# Patient Record
Sex: FEMALE | Race: WHITE | NOT HISPANIC OR LATINO | Employment: FULL TIME | URBAN - METROPOLITAN AREA
[De-identification: names, ages, dates, MRNs, and addresses within clinical notes are randomized per-mention and may not be internally consistent; named-entity substitution may affect disease eponyms.]

---

## 2017-11-06 ENCOUNTER — HOSPITAL ENCOUNTER (OUTPATIENT)
Dept: RADIOLOGY | Facility: HOSPITAL | Age: 32
Discharge: HOME | End: 2017-11-06
Attending: ORTHOPAEDIC SURGERY
Payer: COMMERCIAL

## 2017-11-06 ENCOUNTER — OFFICE VISIT (OUTPATIENT)
Dept: SPORTS MEDICINE | Facility: CLINIC | Age: 32
End: 2017-11-06
Payer: COMMERCIAL

## 2017-11-06 VITALS
BODY MASS INDEX: 21.97 KG/M2 | SYSTOLIC BLOOD PRESSURE: 111 MMHG | HEIGHT: 67 IN | HEART RATE: 86 BPM | DIASTOLIC BLOOD PRESSURE: 74 MMHG | WEIGHT: 140 LBS

## 2017-11-06 DIAGNOSIS — M25.562 LEFT KNEE PAIN, UNSPECIFIED CHRONICITY: Primary | ICD-10-CM

## 2017-11-06 DIAGNOSIS — Z98.890 S/P ACL RECONSTRUCTION: ICD-10-CM

## 2017-11-06 DIAGNOSIS — S83.512A RUPTURE OF ANTERIOR CRUCIATE LIGAMENT OF LEFT KNEE, INITIAL ENCOUNTER: ICD-10-CM

## 2017-11-06 DIAGNOSIS — M25.562 LEFT KNEE PAIN, UNSPECIFIED CHRONICITY: ICD-10-CM

## 2017-11-06 DIAGNOSIS — M23.92 INTERNAL DERANGEMENT OF KNEE JOINT, LEFT: ICD-10-CM

## 2017-11-06 PROCEDURE — 99999 PR PBB SHADOW E&M-NEW PATIENT-LVL IV: CPT | Mod: PBBFAC,,, | Performed by: ORTHOPAEDIC SURGERY

## 2017-11-06 PROCEDURE — 73564 X-RAY EXAM KNEE 4 OR MORE: CPT | Mod: TC,50,PO

## 2017-11-06 PROCEDURE — 73564 X-RAY EXAM KNEE 4 OR MORE: CPT | Mod: 26,50,, | Performed by: RADIOLOGY

## 2017-11-06 PROCEDURE — 99204 OFFICE O/P NEW MOD 45 MIN: CPT | Mod: 57,S$GLB,, | Performed by: ORTHOPAEDIC SURGERY

## 2017-11-06 NOTE — PROGRESS NOTES
Subjective:          Chief Complaint: Swapna Watkins is a 32 y.o. female who had concerns including Pain of the Left Knee.    Patient reports locking beginning in two years prior.  Denies trauma or inciting incident. Has MRI from 10/26/16 showing ACL graft rupture. Denies pain.    History of L ACL reconstruction 12/6/11 by Dr. Luke.        Pain   Pertinent negatives include no chest pain, fever, joint swelling, numbness or rash.       Review of Systems   Constitution: Negative for fever and night sweats.   HENT: Negative for hearing loss.    Eyes: Negative for blurred vision and visual disturbance.   Cardiovascular: Negative for chest pain and leg swelling.   Respiratory: Negative for shortness of breath.    Endocrine: Negative for polyuria.   Hematologic/Lymphatic: Negative for bleeding problem.   Skin: Negative for rash.   Musculoskeletal: Positive for joint pain. Negative for back pain, joint swelling, muscle cramps and muscle weakness.   Gastrointestinal: Negative for melena.   Genitourinary: Negative for hematuria.   Neurological: Negative for loss of balance, numbness and paresthesias.   Psychiatric/Behavioral: Negative for altered mental status.       Pain Related Questions  Over the past 3 days, what was your average pain during activity? (I.e. running, jogging, walking, climbing stairs, getting dressed, ect.): 0  Over the past 3 days, what was your highest pain level?: 0  Over the past 3 days, what was your lowest pain level? : 0    Other  How many nights a week are you awakened by your affected body part?: 0  Was the patient's HEIGHT measured or patient reported?: Patient Reported  Was the patient's WEIGHT measured or patient reported?: Measured      Objective:        General: Swapna CORONA is well-developed, well-nourished, appears stated age, in no acute distress, alert and oriented to time, place and person.     General    Vitals reviewed.  Constitutional: She is oriented to person, place, and time. She  appears well-developed and well-nourished. No distress.   HENT:   Mouth/Throat: No oropharyngeal exudate.   Eyes: Right eye exhibits no discharge. Left eye exhibits no discharge.   Neck: Normal range of motion.   Pulmonary/Chest: Effort normal and breath sounds normal. No respiratory distress.   Neurological: She is alert and oriented to person, place, and time. She has normal reflexes. No cranial nerve deficit. Coordination normal.   Psychiatric: She has a normal mood and affect. Her behavior is normal. Judgment and thought content normal.     General Musculoskeletal Exam   Gait: normal       Right Knee Exam     Inspection   Erythema: absent  Scars: absent  Swelling: absent  Effusion: effusion  Deformity: deformity  Bruising: absent    Tenderness   The patient is experiencing no tenderness.         Range of Motion   Extension: -5   Flexion: 150     Tests   Meniscus   Shayna:  Medial - negative Lateral - negative  Ligament Examination Lachman: normal (-1 to 2mm) PCL-Posterior Drawer: normal (0 to 2mm)     MCL - Valgus: normal (0 to 2mm)  LCL - Varus: normalPivot Shift: normal (Equal)Reverse Pivot Shift: normal (Equal)Dial Test at 30 degrees: normal (< 5 degrees)Dial Test at 90 degrees: normal (< 5 degrees)  Posterior Sag Test: negative  Posterolateral Corner: unstable (>15 degrees difference)  Patella   Patellar Apprehension: negative  Passive Patellar Tilt: neutral  Patellar Tracking: normal  Patellar Glide (quadrants): Lateral - 1   Medial - 2  Q-Angle at 90 degrees: normal  Patellar Grind: negative  J-Sign: none    Other   Meniscal Cyst: absent  Popliteal (Baker's) Cyst: absent  Sensation: normal    Left Knee Exam     Inspection   Erythema: absent  Scars: absent  Swelling: absent  Effusion: absent  Deformity: deformity  Bruising: absent    Tenderness   The patient tender to palpation of the lateral joint line.    Range of Motion   Extension: 0   Flexion: 150     Tests   Meniscus   Shayna:  Medial - negative  Lateral - negative  Stability Lachman: normal (-1 to 2mm) PCL-Posterior Drawer: normal (0 to 2mm)  MCL - Valgus: normal (0 to 2mm)  LCL - Varus: normal (0 to 2mm)Pivot Shift: normal (Equal)Reverse Pivot Shift: normal (Equal)Dial Test at 30 degrees: normal (< 5 degrees)Dial Test at 90 degrees: normal (< 5 degrees)  Posterior Sag Test: negative  Posterolateral Corner: unstable (>15 degrees difference)  Patella   Patellar Apprehension: negative  Passive Patellar Tilt: neutral  Patellar Tracking: normal  Patellar Glide (Quadrants): Lateral - 1 Medial - 2  Q-Angle at 90 degrees: normal  Patellar Grind: negative  J-Sign: J sign absent    Other   Meniscal Cyst: absent  Popliteal (Baker's) Cyst: absent  Sensation: normal    Right Hip Exam     Tests   Heather: negative  Left Hip Exam     Tests   Heather: negative          Reflexes     Left Side  Quadriceps:  2+  Achilles:  2+    Right Side   Quadriceps:  2+  Achilles:  2+    Vascular Exam     Right Pulses  Dorsalis Pedis:      2+  Posterior Tibial:      2+        Left Pulses  Dorsalis Pedis:      2+  Posterior Tibial:      2+        MRI 10/26/16:    There is a complete rupture of the ACL graft and there is associated osteolysis            Assessment:       Encounter Diagnoses   Name Primary?    Left knee pain, unspecified chronicity Yes    Internal derangement of knee joint, left     S/P ACL reconstruction     Rupture of anterior cruciate ligament of left knee, initial encounter           Plan:       1. IKDC, SF-12 and KOOS was filled out today in clinic.     RTC in 1 weeks with Dr. Washington Deutsch for CT review. Patient will not fill out IKDC, SF-12 and KOOS on return.    2. CT left knee to evaluate tunnel location and size    3. We reviewed with Swapna CORONA today, the pathology and natural history of her diagnosis. We have discussed a variety of treatment options including medications, physical therapy and other alternative treatments. I also explained the indications, risks and  benefits of surgery. After discussion, Swapna CORONA decided to proceed with surgery. The decision was made to go forward with     Stage 1    1. Left knee arthroscopic debridement bone tunnel  2. Left knee arthroscopic chondroplasty  3. Left knee arthroscopic synovectomy  4. Left tibia bone tunnel grafting  5. Left femur bone tunnel bone grafting  6. Left iliac crest graft harvest  7. Left knee Amniox arthrocentesis    Stage 2  1. Left knee ACL reconstruction with hamstring autograft (double bundle)  2. Left knee Amniox arthrocentesis      The details of the surgical procedure were explained, including the location of probable incisions and a description of likely hardware and/or grafts to be used.  The patient understands the likely convalescence after surgery.  Also, we have thoroughly discussed the risks, benefits and alternatives to surgery, including, but not limited to, the risk of infection, joint stiffness, blood clot (including DVT and/or pulmonary embolus), neurologic and vascular injury.  It was explained that, if tissue has been repaired or reconstructed, there is a chance of failure, which may require further management.      All of the patient's questions were answered and informed consent was obtained. The patient will contact us if they have any questions or concerns in the interim.                    [facility] patient questionnaires have been collected today.

## 2017-11-13 ENCOUNTER — OFFICE VISIT (OUTPATIENT)
Dept: SPORTS MEDICINE | Facility: CLINIC | Age: 32
End: 2017-11-13
Payer: COMMERCIAL

## 2017-11-13 VITALS
HEART RATE: 85 BPM | WEIGHT: 140 LBS | SYSTOLIC BLOOD PRESSURE: 139 MMHG | DIASTOLIC BLOOD PRESSURE: 84 MMHG | BODY MASS INDEX: 21.97 KG/M2 | HEIGHT: 67 IN

## 2017-11-13 DIAGNOSIS — M23.92 INTERNAL DERANGEMENT OF KNEE JOINT, LEFT: ICD-10-CM

## 2017-11-13 DIAGNOSIS — M23.92 INTERNAL DERANGEMENT OF LEFT KNEE: ICD-10-CM

## 2017-11-13 DIAGNOSIS — Z98.890 S/P ACL RECONSTRUCTION: Primary | ICD-10-CM

## 2017-11-13 DIAGNOSIS — S83.512A RUPTURE OF ANTERIOR CRUCIATE LIGAMENT OF LEFT KNEE, INITIAL ENCOUNTER: ICD-10-CM

## 2017-11-13 DIAGNOSIS — S83.512A RUPTURE OF ANTERIOR CRUCIATE LIGAMENT OF LEFT KNEE, INITIAL ENCOUNTER: Primary | ICD-10-CM

## 2017-11-13 DIAGNOSIS — M85.60 BONE CYST: ICD-10-CM

## 2017-11-13 PROCEDURE — 99214 OFFICE O/P EST MOD 30 MIN: CPT | Mod: S$GLB,,, | Performed by: ORTHOPAEDIC SURGERY

## 2017-11-13 PROCEDURE — 99999 PR PBB SHADOW E&M-EST. PATIENT-LVL III: CPT | Mod: PBBFAC,,, | Performed by: ORTHOPAEDIC SURGERY

## 2017-11-13 NOTE — PROGRESS NOTES
Subjective:          Chief Complaint: Swapna Watkins is a 32 y.o. female who had concerns including Follow-up of the Left Knee.    Patient is here for a follow up for her left knee to review her CT scan.    Has MRI from 10/26/16 showing ACL graft rupture.     History of L ACL reconstruction 12/6/11 by Dr. Luke.        Pain   Pertinent negatives include no chest pain, fever, joint swelling, numbness or rash.       Review of Systems   Constitution: Negative for fever and night sweats.   HENT: Negative for hearing loss.    Eyes: Negative for blurred vision and visual disturbance.   Cardiovascular: Negative for chest pain and leg swelling.   Respiratory: Negative for shortness of breath.    Endocrine: Negative for polyuria.   Hematologic/Lymphatic: Negative for bleeding problem.   Skin: Negative for rash.   Musculoskeletal: Positive for joint pain. Negative for back pain, joint swelling, muscle cramps and muscle weakness.   Gastrointestinal: Negative for melena.   Genitourinary: Negative for hematuria.   Neurological: Negative for loss of balance, numbness and paresthesias.   Psychiatric/Behavioral: Negative for altered mental status.       Pain Related Questions  Over the past 3 days, what was your average pain during activity? (I.e. running, jogging, walking, climbing stairs, getting dressed, ect.): 0  Over the past 3 days, what was your highest pain level?: 0  Over the past 3 days, what was your lowest pain level? : 0    Other  How many nights a week are you awakened by your affected body part?: 0  Was the patient's HEIGHT measured or patient reported?: Patient Reported  Was the patient's WEIGHT measured or patient reported?: Measured      Objective:        General: Swapna CORONA is well-developed, well-nourished, appears stated age, in no acute distress, alert and oriented to time, place and person.     General    Vitals reviewed.  Constitutional: She is oriented to person, place, and time. She appears well-developed and  well-nourished. No distress.   HENT:   Mouth/Throat: No oropharyngeal exudate.   Eyes: Right eye exhibits no discharge. Left eye exhibits no discharge.   Neck: Normal range of motion.   Pulmonary/Chest: Effort normal and breath sounds normal. No respiratory distress.   Neurological: She is alert and oriented to person, place, and time. She has normal reflexes. No cranial nerve deficit. Coordination normal.   Psychiatric: She has a normal mood and affect. Her behavior is normal. Judgment and thought content normal.     General Musculoskeletal Exam   Gait: normal       Right Knee Exam     Inspection   Erythema: absent  Scars: absent  Swelling: absent  Effusion: effusion  Deformity: deformity  Bruising: absent    Tenderness   The patient is experiencing no tenderness.         Range of Motion   Extension: -5   Flexion: 150     Tests   Meniscus   Shayna:  Medial - negative Lateral - negative  Ligament Examination Lachman: normal (-1 to 2mm) PCL-Posterior Drawer: normal (0 to 2mm)     MCL - Valgus: normal (0 to 2mm)  LCL - Varus: normalPivot Shift: normal (Equal)Reverse Pivot Shift: normal (Equal)Dial Test at 30 degrees: normal (< 5 degrees)Dial Test at 90 degrees: normal (< 5 degrees)  Posterior Sag Test: negative  Posterolateral Corner: unstable (>15 degrees difference)  Patella   Patellar Apprehension: negative  Passive Patellar Tilt: neutral  Patellar Tracking: normal  Patellar Glide (quadrants): Lateral - 1   Medial - 2  Q-Angle at 90 degrees: normal  Patellar Grind: negative  J-Sign: none    Other   Meniscal Cyst: absent  Popliteal (Baker's) Cyst: absent  Sensation: normal    Left Knee Exam     Inspection   Erythema: absent  Scars: absent  Swelling: absent  Effusion: absent  Deformity: deformity  Bruising: absent    Tenderness   The patient tender to palpation of the lateral joint line.    Range of Motion   Extension: 0   Flexion: 150     Tests   Meniscus   Shayna:  Medial - negative Lateral -  negative  Stability Lachman: normal (-1 to 2mm) PCL-Posterior Drawer: normal (0 to 2mm)  MCL - Valgus: normal (0 to 2mm)  LCL - Varus: normal (0 to 2mm)Pivot Shift: normal (Equal)Reverse Pivot Shift: normal (Equal)Dial Test at 30 degrees: normal (< 5 degrees)Dial Test at 90 degrees: normal (< 5 degrees)  Posterior Sag Test: negative  Posterolateral Corner: unstable (>15 degrees difference)  Patella   Patellar Apprehension: negative  Passive Patellar Tilt: neutral  Patellar Tracking: normal  Patellar Glide (Quadrants): Lateral - 1 Medial - 2  Q-Angle at 90 degrees: normal  Patellar Grind: negative  J-Sign: J sign absent    Other   Meniscal Cyst: absent  Popliteal (Baker's) Cyst: absent  Sensation: normal    Right Hip Exam     Tests   Heather: negative  Left Hip Exam     Tests   Heather: negative          Reflexes     Left Side  Quadriceps:  2+  Achilles:  2+    Right Side   Quadriceps:  2+  Achilles:  2+    Vascular Exam     Right Pulses  Dorsalis Pedis:      2+  Posterior Tibial:      2+        Left Pulses  Dorsalis Pedis:      2+  Posterior Tibial:      2+        MRI 10/26/16:    There is a complete rupture of the ACL graft and there is associated osteolysis    CT Lower:   17 mm x 17 mm tibial bone cyst  21 x 11 mm femoral bone cyst            Assessment:       Encounter Diagnoses   Name Primary?    S/P ACL reconstruction Yes    Rupture of anterior cruciate ligament of left knee, initial encounter     Internal derangement of knee joint, left     Bone cyst           Plan:       1. IKDC, SF-12 and KOOS was filled out today in clinic.     RTC in 1 weeks with Dr. Washington Deutsch for CT review. Patient will not fill out IKDC, SF-12 and KOOS on return.    2. CT left knee to evaluate tunnel location and size    3. We reviewed with Swapna squires, the pathology and natural history of her diagnosis. We have discussed a variety of treatment options including medications, physical therapy and other alternative treatments. I also  explained the indications, risks and benefits of surgery. After discussion, Swapna CORONA decided to proceed with surgery. The decision was made to go forward with     Stage 1    1. Left knee arthroscopic debridement bone tunnel  2. Left knee arthroscopic chondroplasty  3. Left knee arthroscopic synovectomy  4. Left tibia bone tunnel grafting  5. Left femur bone tunnel bone grafting  6. Left iliac crest graft harvest  7. Left knee Amniox arthrocentesis    Stage 2  1. Left knee ACL reconstruction with hamstring autograft (double bundle)  2. Left knee Amniox arthrocentesis      The details of the surgical procedure were explained, including the location of probable incisions and a description of likely hardware and/or grafts to be used.  The patient understands the likely convalescence after surgery.  Also, we have thoroughly discussed the risks, benefits and alternatives to surgery, including, but not limited to, the risk of infection, joint stiffness, blood clot (including DVT and/or pulmonary embolus), neurologic and vascular injury.  It was explained that, if tissue has been repaired or reconstructed, there is a chance of failure, which may require further management.      All of the patient's questions were answered and informed consent was obtained. The patient will contact us if they have any questions or concerns in the interim.                    [facility] patient questionnaires have been collected today.

## 2017-11-13 NOTE — PATIENT INSTRUCTIONS
Knee Arthroscopy: Conditions Treated  Arthroscopy is used to find and treat many types of knee problems. These include tears in the meniscal cartilage, joint loose bodies, or anterior cruciate ligament (ACL) tears.     Damaged meniscal cartilage is removed.   Meniscal cartilage tears  There are several types of meniscal cartilage tears. The meniscal cartilage attaches on the tibia (shinbone) and acts as a shock absorber for the knee. Your surgery will depend on the type and extent of your injury. Your surgeon can remove the damaged tissue or fix the tear. Treatment should ease the pain and swelling. It can also help keep the joint from locking.     To replace damaged ACL tissue, a graft of strong, healthy tissue is attached.   ACL tears  A torn ACL (anterior cruciate ligament) can make the knee unstable. You may have pain and swelling, and your knee may give out. Your surgeon can repair the ACL by reconstructing it. To rebuild your ACL, damaged tissue may be replaced with a graft of healthy tissue from an area near your knee, or from a donor.     Date Last Reviewed: 8/31/2015 © 2000-2017 [employer]. [address] [place] [address]. All rights reserved. This information is not intended as a substitute for professional medical care. Always follow your healthcare professional's instructions.        Knee Arthroscopy  Knee problems can often be diagnosed and treated with a technique called arthroscopy. This type of surgery is done using an instrument called an arthroscope (scope). Only a few small incisions are needed for this surgery. The procedure can be used to diagnose a knee problem. In many cases, treatment can also be done using arthroscopy.     Insertion of fluid, arthroscope, and instruments through small incisions (portals).         Patient undergoes procedure      The arthroscope  The scope allows the doctor to look directly into the knee joint. It is about the size of a pencil and  contains a pathway for fluids. It also contains coated glass fibers that beam an intense, cool light into the knee joint. A camera is attached to the scope as well. It provides clear images of most areas in your knee joint. The doctor views these images on a monitor.  Preparing for the procedure  · Have lab or other testing done as advised.  · Tell your doctor about any medicines or supplements you take  · Do not eat or drink anything for 10 hours before the procedure.  · Once you arrive for surgery, you will be given an IV line in your arm or hand. This provides fluids and medicines.  · To keep you free of pain during the surgery, youll receive medicine called anesthesia. You may have:  ¨ General anesthesia. This puts you into a deep sleep during the surgery.  ¨ Regional anesthesia. This numbs the body from the waist down.  ¨ Local anesthesia. This numbs just the knee.  In addition to regional or local anesthesia, you may receive sedation. This medicine makes you relaxed and sleepy during the surgery.  The procedure  · A few small incisions (portals) are made in your knee.  · The scope is inserted through one of the portals.  · Sterile fluid is put into the knee joint. This makes it easier to see and work inside your joint.  · Using the scope, the doctor confirms the type and degree of knee damage. If possible, the problem is treated at this time. This is done using surgical tools put through the other portals.  · When the surgery is done, all tools are removed. The incisions are closed with sutures, staples, surgical glue, or strips of surgical tape.  Risks and complications of arthroscopy  All surgeries have risks. The risks of arthroscopy include:  · Bleeding  · Infection  · Blood clots  · Swelling and stiffness of the knee  · Injury to normal tissue  · Continuing knee problems   Date Last Reviewed: 9/20/2015  © 7213-7846 [employer]. [address] [place] [address], [place] [address]. All rights reserved.  This information is not intended as a substitute for professional medical care. Always follow your healthcare professional's instructions.        After Knee Arthroscopy  After surgery, your joint may be swollen, painful, and stiff. Your recovery time will depend on what was done. Your surgeon will tell you when to resume activity and weight bearing. If you had meniscal cartilage or loose bodies removed, you may be told to bear weight early on. After ACL repair, do not pivot or make sudden moves.     You may be told to ride an exercise bike daily. This will help restore your knee's motion and strength.   At home  Follow your surgeons guidelines for healing:  · Elevate your knee as much as possible and ice your knee for 20 minutes. then allow at least 20 minutes before the next icing session.  · Limit weight-bearing, if instructed to do so.  · Keep your knee bandaged according to your surgeon's instructions.  · When you shower, wrap your knee with plastic to keep it dry.  · Take pain medicine as directed.  Your checklist  The checklist below helps remind you what to do after arthroscopy.  ? Schedule your first follow-up visit for 5 days after surgery or as directed by your surgeon.  ? Take care of your incision and bathe as directed.  ? Complete your physical therapy program.  ? Talk with your surgeon about activities you can do immediately and those that need to wait.  Contact your surgeon right away if you have any of the following:  · Fever  · Chills  · Persistent warmth or redness around the knee  · Persistent or increased pain  · Significant swelling in your knee  · Increasing pain in your calf muscle  Date Last Reviewed: 9/22/2015 © 2000-2017 [employer]. [address] [place] [address], [place] [address]. All rights reserved. This information is not intended as a substitute for professional medical care. Always follow your healthcare professional's instructions.

## 2017-11-20 ENCOUNTER — HOSPITAL ENCOUNTER (OUTPATIENT)
Dept: PREADMISSION TESTING | Facility: OTHER | Age: 32
Discharge: HOME | End: 2017-11-20
Attending: ORTHOPAEDIC SURGERY
Payer: COMMERCIAL

## 2017-11-20 ENCOUNTER — OFFICE VISIT (OUTPATIENT)
Dept: SPORTS MEDICINE | Facility: CLINIC | Age: 32
End: 2017-11-20
Payer: COMMERCIAL

## 2017-11-20 ENCOUNTER — ANESTHESIA EVENT (OUTPATIENT)
Dept: SURGERY | Facility: OTHER | Age: 32
End: 2017-11-20
Payer: COMMERCIAL

## 2017-11-20 VITALS
SYSTOLIC BLOOD PRESSURE: 108 MMHG | HEART RATE: 78 BPM | WEIGHT: 140 LBS | TEMPERATURE: 98 F | DIASTOLIC BLOOD PRESSURE: 64 MMHG | BODY MASS INDEX: 21.97 KG/M2 | HEIGHT: 67 IN | OXYGEN SATURATION: 98 %

## 2017-11-20 VITALS
HEART RATE: 90 BPM | DIASTOLIC BLOOD PRESSURE: 70 MMHG | BODY MASS INDEX: 21.97 KG/M2 | SYSTOLIC BLOOD PRESSURE: 108 MMHG | HEIGHT: 67 IN | WEIGHT: 140 LBS

## 2017-11-20 DIAGNOSIS — S83.512D RUPTURE OF ANTERIOR CRUCIATE LIGAMENT OF LEFT KNEE, SUBSEQUENT ENCOUNTER: ICD-10-CM

## 2017-11-20 DIAGNOSIS — M23.92 ACUTE INTERNAL DERANGEMENT OF LEFT KNEE: Primary | ICD-10-CM

## 2017-11-20 PROCEDURE — 99499 UNLISTED E&M SERVICE: CPT | Mod: S$GLB,,, | Performed by: PHYSICIAN ASSISTANT

## 2017-11-20 PROCEDURE — 99999 PR PBB SHADOW E&M-EST. PATIENT-LVL IV: CPT | Mod: PBBFAC,,, | Performed by: PHYSICIAN ASSISTANT

## 2017-11-20 RX ORDER — CELECOXIB 200 MG/1
200 CAPSULE ORAL 2 TIMES DAILY
Qty: 60 CAPSULE | Refills: 2 | Status: SHIPPED | OUTPATIENT
Start: 2017-11-20 | End: 2018-03-15 | Stop reason: SDUPTHER

## 2017-11-20 RX ORDER — FAMOTIDINE 20 MG/1
20 TABLET, FILM COATED ORAL
Status: CANCELLED | OUTPATIENT
Start: 2017-11-20 | End: 2017-11-20

## 2017-11-20 RX ORDER — SODIUM CHLORIDE, SODIUM LACTATE, POTASSIUM CHLORIDE, CALCIUM CHLORIDE 600; 310; 30; 20 MG/100ML; MG/100ML; MG/100ML; MG/100ML
INJECTION, SOLUTION INTRAVENOUS CONTINUOUS
Status: CANCELLED | OUTPATIENT
Start: 2017-11-20

## 2017-11-20 RX ORDER — MIDAZOLAM HYDROCHLORIDE 5 MG/ML
4 INJECTION INTRAMUSCULAR; INTRAVENOUS ONCE AS NEEDED
Status: CANCELLED | OUTPATIENT
Start: 2017-11-20 | End: 2017-11-20

## 2017-11-20 RX ORDER — PREGABALIN 75 MG/1
150 CAPSULE ORAL ONCE
Status: CANCELLED | OUTPATIENT
Start: 2017-11-20 | End: 2017-11-20

## 2017-11-20 RX ORDER — ASPIRIN 325 MG
325 TABLET, DELAYED RELEASE (ENTERIC COATED) ORAL DAILY
Qty: 42 TABLET | Refills: 0 | Status: SHIPPED | OUTPATIENT
Start: 2017-11-20 | End: 2018-03-15 | Stop reason: SDUPTHER

## 2017-11-20 RX ORDER — PROMETHAZINE HYDROCHLORIDE 25 MG/1
25 TABLET ORAL EVERY 6 HOURS PRN
Qty: 30 TABLET | Refills: 0 | Status: SHIPPED | OUTPATIENT
Start: 2017-11-20 | End: 2017-12-04 | Stop reason: SDUPTHER

## 2017-11-20 RX ORDER — LIDOCAINE HYDROCHLORIDE 10 MG/ML
1 INJECTION, SOLUTION EPIDURAL; INFILTRATION; INTRACAUDAL; PERINEURAL ONCE
Status: CANCELLED | OUTPATIENT
Start: 2017-11-20 | End: 2017-11-20

## 2017-11-20 RX ORDER — OXYCODONE AND ACETAMINOPHEN 10; 325 MG/1; MG/1
1 TABLET ORAL EVERY 6 HOURS PRN
Qty: 60 TABLET | Refills: 0 | Status: SHIPPED | OUTPATIENT
Start: 2017-11-20 | End: 2017-12-04 | Stop reason: SDUPTHER

## 2017-11-20 RX ORDER — ALBUTEROL SULFATE 0.83 MG/ML
2.5 SOLUTION RESPIRATORY (INHALATION)
Status: CANCELLED | OUTPATIENT
Start: 2017-11-20 | End: 2017-11-20

## 2017-11-20 NOTE — ANESTHESIA PREPROCEDURE EVALUATION
11/20/2017  Swapna Watkins is a 32 y.o., female.    Anesthesia Evaluation    I have reviewed the Patient Summary Reports.    I have reviewed the Nursing Notes.   I have reviewed the Medications.     Review of Systems  Anesthesia Hx:  Denies Family Hx of Anesthesia complications.   Denies Personal Hx of Anesthesia complications.   Social:  Smoker 1/2 ppd   Hematology/Oncology:  Hematology Normal   Oncology Normal     EENT/Dental:EENT/Dental Normal   Cardiovascular:  Cardiovascular Normal Exercise tolerance: good     Pulmonary:  Pulmonary Normal    Renal/:  Renal/ Normal     Hepatic/GI:  Hepatic/GI Normal    Musculoskeletal:  Musculoskeletal Normal    Neurological:  Neurology Normal    Endocrine:  Endocrine Normal    Dermatological:  Skin Normal    Psych:  Psychiatric Normal           Physical Exam  General:  Well nourished    Airway/Jaw/Neck:  Airway Findings: Mouth Opening: Normal Mallampati: I  TM Distance: Normal, at least 6 cm      Dental:  Dental Findings: In tact        Mental Status:  Mental Status Findings:  Cooperative, Alert and Oriented         Anesthesia Plan  Type of Anesthesia, risks & benefits discussed:  Anesthesia Type:  general  Patient's Preference:   Intra-op Monitoring Plan: standard ASA monitors  Intra-op Monitoring Plan Comments:   Post Op Pain Control Plan: peripheral nerve block  Post Op Pain Control Plan Comments:   Induction:    Beta Blocker:         Informed Consent: Patient understands risks and agrees with Anesthesia plan.  Questions answered. Anesthesia consent signed with patient.  ASA Score: 2     Day of Surgery Review of History & Physical:    H&P update referred to the surgeon.     Anesthesia Plan Notes: Pt requests FNB, if possible. Had bad experience with pain after previous ACL surgery.     Very anxious, ordered IM versed    Day of surgery update: Dr. Get CONNOR'd  FNB        Ready For Surgery From Anesthesia Perspective.

## 2017-11-20 NOTE — DISCHARGE INSTRUCTIONS
PRE-ADMIT TESTING -  [phone]    [address]  [place] [facility]          Your surgery has been scheduled at [facility]. We are pleased to have the opportunity to serve you. For Further Information please call [phone].    On the day of surgery please report to the Information Desk on the 1st floor.    · CONTACT YOUR PHYSICIAN'S OFFICE THE DAY PRIOR TO YOUR SURGERY TO OBTAIN YOUR ARRIVAL TIME.     · The evening before surgery do not eat anything after 9 p.m. ( this includes hard candy, chewing gum and mints).  You may only have GATORADE, POWERADE AND WATER  from 9 p.m. until you leave your home.   DO NOT DRINK ANY LIQUIDS ON THE WAY TO THE HOSPITAL.      SPECIAL MEDICATION INSTRUCTIONS: TAKE medications checked off by the Anesthesiologist on your Medication List.    Angiogram Patients: Take medications as instructed by your physician, including aspirin.     Surgery Patients:    If you take ASPIRIN - Your PHYSICIAN/SURGEON will need to inform you IF/OR when you need to stop taking aspirin prior to your surgery.     Do Not take any medications containing IBUPROFEN.  Do Not Wear any make-up or dark nail polish   (especially eye make-up) to surgery. If you come to surgery with makeup on you will be required to remove the makeup or nail polish.    Do not shave your surgical area at least 5 days prior to your surgery. The surgical prep will be performed at the hospital according to Infection Control regulations.    Leave all valuables at home.   Do Not wear any jewelry or watches, including any metal in body piercings.  Contact Lens must be removed before surgery. Either do not wear the contact lens or bring a case and solution for storage.  Please bring a container for eyeglasses or dentures as required.  Bring any paperwork your physician has provided, such as consent forms,  history and physicals, doctor's orders, etc.   Bring comfortable clothes that are loose fitting to wear upon  discharge. Take into consideration the type of surgery being performed.  Maintain your diet as advised per your physician the day prior to surgery.      Adequate rest the night before surgery is advised.   Park in the Parking lot behind the hospital or in the [place] [facility] across the street from the parking lot. Parking is complimentary.  If you will be discharged the same day as your procedure, please arrange for a responsible adult to drive you home or to accompany you if traveling by taxi.   YOU WILL NOT BE PERMITTED TO DRIVE OR TO LEAVE THE HOSPITAL ALONE AFTER SURGERY.   It is strongly recommended that you arrange for someone to remain with you for the first 24 hrs following your surgery.       Thank you for your cooperation.  The Staff of [facility].        Bathing Instructions                                                                 Please shower the evening before and morning of your procedure with    ANTIBACTERIAL SOAP. ( DIAL, etc )  Concentrate on the surgical area   for at least 3 minutes and rinse completely. Dry off as usual.   Do not use any deodorant, powder, body lotions, perfume, after shave or    cologne.

## 2017-11-20 NOTE — H&P
Swapna Watkins  is here for a completion of her perioperative paperwork. she  Is scheduled to undergo 1. Left knee arthroscopic debridement bone tunnel  2. Left knee arthroscopic chondroplasty  3. Left knee arthroscopic synovectomy  4. Left tibia bone tunnel grafting  5. Left femur bone tunnel bone grafting  6. Left iliac crest graft harvest  7. Left knee Amniox arthrocentesis on 11/21/2017.  She is a healthy individual and does not need clearance for this procedure.     Risks, indications and benefits of the surgical procedure were discussed with the patient. All questions with regard to surgery, rehab, expected return to functional activities, activities of daily living and recreational endeavors were answered to her satisfaction.    Once no other questions were asked, a brief history and physical exam was then performed.      PHYSICAL EXAM:  GEN: A&Ox3, WD WN NAD  HEENT: WNL  CHEST: CTAB, no W/R/R  HEART: RRR, no M/R/G  ABD: Soft, NT ND, BS x4 QUADS  MS; See Epic  NEURO: CN II-XII intact       The surgical consent was then reviewed with the patient, who agreed with all the contents of the consent form and it was signed. she was then given the [facility] surgery packet to bring with her to [facility] for the anesthesia portion of her perioperative paperwork.     PHYSICAL THERAPY:  She was also instructed regarding physical therapy and will begin on  POD #1-3 at [facility] in [place].    POST OP CARE:instructions were reviewed including care of the wound and dressing after surgery and when she can shower.     PAIN MANAGEMENT: Swapna Watkins was also given her pain management regime, which includes the TENS unit which she has from previous surgeries along with the education required for its use. She was also instructed regarding the Polar ice unit that will be in place after surgery and her postoperative pain medications.     PAIN MEDICATION:  Percocet 10/325mg 1 po q 4-6 hours prn pain  Phenergan 25 mg one p.o.  q.4-6 hours p.r.n. nausea and vomiting.  ASA 325mg once a day x 6 weeks     As there were no other questions to be asked, she was given my business card along with Washington Deutsch MD business card if she has any questions or concerns prior to surgery or in the postop period.

## 2017-11-20 NOTE — PRE-PROCEDURE INSTRUCTIONS
Reviewed  knee arthroscopy discharge instructions and demonstrated the postoperative equipment (polar ice and BREG t-scope knee brace), with verbalized understanding per patient.  The patient has crutches fitted to her at home from a previous knee surgery.                         .

## 2017-11-21 ENCOUNTER — ANESTHESIA (OUTPATIENT)
Dept: SURGERY | Facility: OTHER | Age: 32
End: 2017-11-21
Payer: COMMERCIAL

## 2017-11-21 ENCOUNTER — HOSPITAL ENCOUNTER (OUTPATIENT)
Facility: OTHER | Age: 32
Discharge: HOME | End: 2017-11-21
Attending: ORTHOPAEDIC SURGERY | Admitting: ORTHOPAEDIC SURGERY
Payer: COMMERCIAL

## 2017-11-21 VITALS
WEIGHT: 140 LBS | OXYGEN SATURATION: 98 % | RESPIRATION RATE: 18 BRPM | HEIGHT: 67 IN | SYSTOLIC BLOOD PRESSURE: 98 MMHG | TEMPERATURE: 98 F | DIASTOLIC BLOOD PRESSURE: 58 MMHG | HEART RATE: 80 BPM | BODY MASS INDEX: 21.97 KG/M2

## 2017-11-21 DIAGNOSIS — S83.512D RUPTURE OF ANTERIOR CRUCIATE LIGAMENT OF LEFT KNEE, SUBSEQUENT ENCOUNTER: ICD-10-CM

## 2017-11-21 DIAGNOSIS — M23.92 ACUTE INTERNAL DERANGEMENT OF LEFT KNEE: ICD-10-CM

## 2017-11-21 LAB
B-HCG UR QL: NEGATIVE
B-HCG UR QL: NEGATIVE
CTP QC/QA: YES
CTP QC/QA: YES

## 2017-11-21 PROCEDURE — 37000009 HC ANESTHESIA EA ADD 15 MINS: Performed by: ORTHOPAEDIC SURGERY

## 2017-11-21 PROCEDURE — 25000003 PHARM REV CODE 250: Performed by: NURSE ANESTHETIST, CERTIFIED REGISTERED

## 2017-11-21 PROCEDURE — 63600175 PHARM REV CODE 636 W HCPCS: Performed by: NURSE ANESTHETIST, CERTIFIED REGISTERED

## 2017-11-21 PROCEDURE — 71000015 HC POSTOP RECOV 1ST HR: Performed by: ORTHOPAEDIC SURGERY

## 2017-11-21 PROCEDURE — 71000033 HC RECOVERY, INTIAL HOUR: Performed by: ORTHOPAEDIC SURGERY

## 2017-11-21 PROCEDURE — 63600175 PHARM REV CODE 636 W HCPCS: Performed by: PHYSICIAN ASSISTANT

## 2017-11-21 PROCEDURE — 71000039 HC RECOVERY, EACH ADD'L HOUR: Performed by: ORTHOPAEDIC SURGERY

## 2017-11-21 PROCEDURE — 25000242 PHARM REV CODE 250 ALT 637 W/ HCPCS: Performed by: ANESTHESIOLOGY

## 2017-11-21 PROCEDURE — 27800903 OPTIME MED/SURG SUP & DEVICES OTHER IMPLANTS: Performed by: ORTHOPAEDIC SURGERY

## 2017-11-21 PROCEDURE — 37000008 HC ANESTHESIA 1ST 15 MINUTES: Performed by: ORTHOPAEDIC SURGERY

## 2017-11-21 PROCEDURE — 25000003 PHARM REV CODE 250: Performed by: ANESTHESIOLOGY

## 2017-11-21 PROCEDURE — 94761 N-INVAS EAR/PLS OXIMETRY MLT: CPT

## 2017-11-21 PROCEDURE — 63600175 PHARM REV CODE 636 W HCPCS: Performed by: ANESTHESIOLOGY

## 2017-11-21 PROCEDURE — 36000710: Performed by: ORTHOPAEDIC SURGERY

## 2017-11-21 PROCEDURE — 29999 UNLISTED PX ARTHROSCOPY: CPT | Mod: ,,, | Performed by: ORTHOPAEDIC SURGERY

## 2017-11-21 PROCEDURE — 27201423 OPTIME MED/SURG SUP & DEVICES STERILE SUPPLY: Performed by: ORTHOPAEDIC SURGERY

## 2017-11-21 PROCEDURE — 36000711: Performed by: ORTHOPAEDIC SURGERY

## 2017-11-21 PROCEDURE — 94640 AIRWAY INHALATION TREATMENT: CPT

## 2017-11-21 PROCEDURE — 81025 URINE PREGNANCY TEST: CPT | Performed by: ANESTHESIOLOGY

## 2017-11-21 PROCEDURE — 81025 URINE PREGNANCY TEST: CPT | Performed by: PHYSICIAN ASSISTANT

## 2017-11-21 PROCEDURE — 63600175 PHARM REV CODE 636 W HCPCS: Performed by: ORTHOPAEDIC SURGERY

## 2017-11-21 PROCEDURE — 71000016 HC POSTOP RECOV ADDL HR: Performed by: ORTHOPAEDIC SURGERY

## 2017-11-21 PROCEDURE — V2790 AMNIOTIC MEMBRANE: HCPCS | Performed by: ORTHOPAEDIC SURGERY

## 2017-11-21 PROCEDURE — 20902 REMOVAL OF BONE FOR GRAFT: CPT | Mod: 51,,, | Performed by: ORTHOPAEDIC SURGERY

## 2017-11-21 DEVICE — FIBER CORTICAL ENHANCE 2.5CC: Type: IMPLANTABLE DEVICE | Site: KNEE | Status: FUNCTIONAL

## 2017-11-21 DEVICE — MATRIX REGENERATIVE CLARIX FLO: Type: IMPLANTABLE DEVICE | Site: KNEE | Status: FUNCTIONAL

## 2017-11-21 RX ORDER — LIDOCAINE HYDROCHLORIDE 10 MG/ML
1 INJECTION, SOLUTION EPIDURAL; INFILTRATION; INTRACAUDAL; PERINEURAL ONCE
Status: DISCONTINUED | OUTPATIENT
Start: 2017-11-21 | End: 2017-11-21 | Stop reason: HOSPADM

## 2017-11-21 RX ORDER — ROPIVACAINE HYDROCHLORIDE 5 MG/ML
INJECTION, SOLUTION EPIDURAL; INFILTRATION; PERINEURAL
Status: DISCONTINUED | OUTPATIENT
Start: 2017-11-21 | End: 2017-11-21

## 2017-11-21 RX ORDER — HYDROMORPHONE HYDROCHLORIDE 2 MG/ML
0.4 INJECTION, SOLUTION INTRAMUSCULAR; INTRAVENOUS; SUBCUTANEOUS EVERY 5 MIN PRN
Status: DISCONTINUED | OUTPATIENT
Start: 2017-11-21 | End: 2017-11-21 | Stop reason: HOSPADM

## 2017-11-21 RX ORDER — ROPIVACAINE HYDROCHLORIDE 5 MG/ML
INJECTION, SOLUTION EPIDURAL; INFILTRATION; PERINEURAL
Status: DISCONTINUED | OUTPATIENT
Start: 2017-11-21 | End: 2017-11-21 | Stop reason: HOSPADM

## 2017-11-21 RX ORDER — GLYCOPYRROLATE 0.2 MG/ML
INJECTION INTRAMUSCULAR; INTRAVENOUS
Status: DISCONTINUED | OUTPATIENT
Start: 2017-11-21 | End: 2017-11-21

## 2017-11-21 RX ORDER — MEPERIDINE HYDROCHLORIDE 50 MG/ML
12.5 INJECTION INTRAMUSCULAR; INTRAVENOUS; SUBCUTANEOUS ONCE AS NEEDED
Status: DISCONTINUED | OUTPATIENT
Start: 2017-11-21 | End: 2017-11-21 | Stop reason: HOSPADM

## 2017-11-21 RX ORDER — DEXAMETHASONE SODIUM PHOSPHATE 4 MG/ML
INJECTION, SOLUTION INTRA-ARTICULAR; INTRALESIONAL; INTRAMUSCULAR; INTRAVENOUS; SOFT TISSUE
Status: DISCONTINUED | OUTPATIENT
Start: 2017-11-21 | End: 2017-11-21

## 2017-11-21 RX ORDER — FENTANYL CITRATE 50 UG/ML
25 INJECTION, SOLUTION INTRAMUSCULAR; INTRAVENOUS EVERY 5 MIN PRN
Status: DISCONTINUED | OUTPATIENT
Start: 2017-11-21 | End: 2017-11-21 | Stop reason: HOSPADM

## 2017-11-21 RX ORDER — OXYCODONE HYDROCHLORIDE 5 MG/1
5 TABLET ORAL
Status: DISCONTINUED | OUTPATIENT
Start: 2017-11-21 | End: 2017-11-21 | Stop reason: HOSPADM

## 2017-11-21 RX ORDER — PROPOFOL 10 MG/ML
VIAL (ML) INTRAVENOUS
Status: DISCONTINUED | OUTPATIENT
Start: 2017-11-21 | End: 2017-11-21

## 2017-11-21 RX ORDER — DIPHENHYDRAMINE HYDROCHLORIDE 50 MG/ML
12.5 INJECTION INTRAMUSCULAR; INTRAVENOUS EVERY 30 MIN PRN
Status: DISCONTINUED | OUTPATIENT
Start: 2017-11-21 | End: 2017-11-21 | Stop reason: HOSPADM

## 2017-11-21 RX ORDER — EPINEPHRINE 1 MG/ML
INJECTION, SOLUTION INTRACARDIAC; INTRAMUSCULAR; INTRAVENOUS; SUBCUTANEOUS
Status: DISCONTINUED | OUTPATIENT
Start: 2017-11-21 | End: 2017-11-21 | Stop reason: HOSPADM

## 2017-11-21 RX ORDER — ALBUTEROL SULFATE 0.83 MG/ML
2.5 SOLUTION RESPIRATORY (INHALATION)
Status: COMPLETED | OUTPATIENT
Start: 2017-11-21 | End: 2017-11-21

## 2017-11-21 RX ORDER — ONDANSETRON HYDROCHLORIDE 2 MG/ML
INJECTION, SOLUTION INTRAMUSCULAR; INTRAVENOUS
Status: DISCONTINUED | OUTPATIENT
Start: 2017-11-21 | End: 2017-11-21

## 2017-11-21 RX ORDER — SODIUM CHLORIDE 0.9 % (FLUSH) 0.9 %
3 SYRINGE (ML) INJECTION
Status: DISCONTINUED | OUTPATIENT
Start: 2017-11-21 | End: 2017-11-21 | Stop reason: HOSPADM

## 2017-11-21 RX ORDER — OXYCODONE HYDROCHLORIDE 5 MG/1
10 TABLET ORAL EVERY 4 HOURS PRN
Status: DISCONTINUED | OUTPATIENT
Start: 2017-11-21 | End: 2017-11-21 | Stop reason: HOSPADM

## 2017-11-21 RX ORDER — PREGABALIN 75 MG/1
150 CAPSULE ORAL ONCE
Status: COMPLETED | OUTPATIENT
Start: 2017-11-21 | End: 2017-11-21

## 2017-11-21 RX ORDER — PHENYLEPHRINE HYDROCHLORIDE 10 MG/ML
INJECTION INTRAVENOUS
Status: DISCONTINUED | OUTPATIENT
Start: 2017-11-21 | End: 2017-11-21

## 2017-11-21 RX ORDER — KETOROLAC TROMETHAMINE 30 MG/ML
INJECTION, SOLUTION INTRAMUSCULAR; INTRAVENOUS
Status: DISCONTINUED | OUTPATIENT
Start: 2017-11-21 | End: 2017-11-21 | Stop reason: HOSPADM

## 2017-11-21 RX ORDER — FAMOTIDINE 20 MG/1
20 TABLET, FILM COATED ORAL
Status: COMPLETED | OUTPATIENT
Start: 2017-11-21 | End: 2017-11-21

## 2017-11-21 RX ORDER — CEFAZOLIN SODIUM 2 G/50ML
2 SOLUTION INTRAVENOUS
Status: DISCONTINUED | OUTPATIENT
Start: 2017-11-21 | End: 2017-11-21 | Stop reason: HOSPADM

## 2017-11-21 RX ORDER — ACETAMINOPHEN 10 MG/ML
INJECTION, SOLUTION INTRAVENOUS
Status: DISCONTINUED | OUTPATIENT
Start: 2017-11-21 | End: 2017-11-21

## 2017-11-21 RX ORDER — FENTANYL CITRATE 50 UG/ML
100 INJECTION, SOLUTION INTRAMUSCULAR; INTRAVENOUS EVERY 5 MIN PRN
Status: COMPLETED | OUTPATIENT
Start: 2017-11-21 | End: 2017-11-21

## 2017-11-21 RX ORDER — PROMETHAZINE HYDROCHLORIDE 25 MG/1
25 TABLET ORAL EVERY 6 HOURS PRN
Status: DISCONTINUED | OUTPATIENT
Start: 2017-11-21 | End: 2017-11-21 | Stop reason: HOSPADM

## 2017-11-21 RX ORDER — ONDANSETRON 2 MG/ML
4 INJECTION INTRAMUSCULAR; INTRAVENOUS EVERY 12 HOURS PRN
Status: DISCONTINUED | OUTPATIENT
Start: 2017-11-21 | End: 2017-11-21 | Stop reason: HOSPADM

## 2017-11-21 RX ORDER — MIDAZOLAM HYDROCHLORIDE 1 MG/ML
4 INJECTION INTRAMUSCULAR; INTRAVENOUS ONCE
Status: COMPLETED | OUTPATIENT
Start: 2017-11-21 | End: 2017-11-21

## 2017-11-21 RX ORDER — MIDAZOLAM HYDROCHLORIDE 5 MG/ML
4 INJECTION INTRAMUSCULAR; INTRAVENOUS ONCE AS NEEDED
Status: COMPLETED | OUTPATIENT
Start: 2017-11-21 | End: 2017-11-21

## 2017-11-21 RX ORDER — ONDANSETRON 2 MG/ML
4 INJECTION INTRAMUSCULAR; INTRAVENOUS DAILY PRN
Status: DISCONTINUED | OUTPATIENT
Start: 2017-11-21 | End: 2017-11-21 | Stop reason: HOSPADM

## 2017-11-21 RX ORDER — CLONIDINE 100 UG/ML
INJECTION, SOLUTION EPIDURAL
Status: DISCONTINUED | OUTPATIENT
Start: 2017-11-21 | End: 2017-11-21 | Stop reason: HOSPADM

## 2017-11-21 RX ORDER — LIDOCAINE HCL/PF 100 MG/5ML
SYRINGE (ML) INTRAVENOUS
Status: DISCONTINUED | OUTPATIENT
Start: 2017-11-21 | End: 2017-11-21

## 2017-11-21 RX ORDER — MORPHINE SULFATE 10 MG/ML
2 INJECTION INTRAMUSCULAR; INTRAVENOUS; SUBCUTANEOUS EVERY 4 HOURS PRN
Status: DISCONTINUED | OUTPATIENT
Start: 2017-11-21 | End: 2017-11-21 | Stop reason: HOSPADM

## 2017-11-21 RX ORDER — SODIUM CHLORIDE, SODIUM LACTATE, POTASSIUM CHLORIDE, CALCIUM CHLORIDE 600; 310; 30; 20 MG/100ML; MG/100ML; MG/100ML; MG/100ML
INJECTION, SOLUTION INTRAVENOUS CONTINUOUS
Status: DISCONTINUED | OUTPATIENT
Start: 2017-11-21 | End: 2017-11-21 | Stop reason: HOSPADM

## 2017-11-21 RX ADMIN — ONDANSETRON 4 MG: 2 INJECTION, SOLUTION INTRAMUSCULAR; INTRAVENOUS at 02:11

## 2017-11-21 RX ADMIN — FAMOTIDINE 20 MG: 20 TABLET, FILM COATED ORAL at 09:11

## 2017-11-21 RX ADMIN — CARBOXYMETHYLCELLULOSE SODIUM 2 DROP: 2.5 SOLUTION/ DROPS OPHTHALMIC at 11:11

## 2017-11-21 RX ADMIN — CEFAZOLIN SODIUM 2 G: 2 SOLUTION INTRAVENOUS at 12:11

## 2017-11-21 RX ADMIN — SODIUM CHLORIDE, SODIUM LACTATE, POTASSIUM CHLORIDE, AND CALCIUM CHLORIDE: 600; 310; 30; 20 INJECTION, SOLUTION INTRAVENOUS at 01:11

## 2017-11-21 RX ADMIN — MIDAZOLAM HYDROCHLORIDE 4 MG: 5 INJECTION, SOLUTION INTRAMUSCULAR; INTRAVENOUS at 09:11

## 2017-11-21 RX ADMIN — PHENYLEPHRINE HYDROCHLORIDE 50 MCG: 10 INJECTION INTRAVENOUS at 12:11

## 2017-11-21 RX ADMIN — GLYCOPYRROLATE 0.2 MG: 0.2 INJECTION, SOLUTION INTRAMUSCULAR; INTRAVENOUS at 12:11

## 2017-11-21 RX ADMIN — FENTANYL CITRATE 25 MCG: 50 INJECTION INTRAMUSCULAR; INTRAVENOUS at 03:11

## 2017-11-21 RX ADMIN — ROPIVACAINE HYDROCHLORIDE 30 ML: 5 INJECTION, SOLUTION EPIDURAL; INFILTRATION; PERINEURAL at 11:11

## 2017-11-21 RX ADMIN — PHENYLEPHRINE HYDROCHLORIDE 50 MCG: 10 INJECTION INTRAVENOUS at 01:11

## 2017-11-21 RX ADMIN — FENTANYL CITRATE 25 MCG: 50 INJECTION, SOLUTION INTRAMUSCULAR; INTRAVENOUS at 12:11

## 2017-11-21 RX ADMIN — LIDOCAINE HYDROCHLORIDE 80 MG: 20 INJECTION, SOLUTION INTRAVENOUS at 11:11

## 2017-11-21 RX ADMIN — OXYCODONE HYDROCHLORIDE 5 MG: 5 TABLET ORAL at 03:11

## 2017-11-21 RX ADMIN — PROPOFOL 200 MG: 10 INJECTION, EMULSION INTRAVENOUS at 11:11

## 2017-11-21 RX ADMIN — ACETAMINOPHEN 1000 MG: 10 INJECTION, SOLUTION INTRAVENOUS at 12:11

## 2017-11-21 RX ADMIN — FENTANYL CITRATE 100 MCG: 50 INJECTION, SOLUTION INTRAMUSCULAR; INTRAVENOUS at 11:11

## 2017-11-21 RX ADMIN — ALBUTEROL SULFATE 2.5 MG: 2.5 SOLUTION RESPIRATORY (INHALATION) at 10:11

## 2017-11-21 RX ADMIN — MIDAZOLAM HYDROCHLORIDE 4 MG: 1 INJECTION INTRAMUSCULAR; INTRAVENOUS at 11:11

## 2017-11-21 RX ADMIN — PREGABALIN 150 MG: 75 CAPSULE ORAL at 09:11

## 2017-11-21 RX ADMIN — SODIUM CHLORIDE, SODIUM LACTATE, POTASSIUM CHLORIDE, AND CALCIUM CHLORIDE: 600; 310; 30; 20 INJECTION, SOLUTION INTRAVENOUS at 10:11

## 2017-11-21 RX ADMIN — DEXAMETHASONE SODIUM PHOSPHATE 8 MG: 4 INJECTION, SOLUTION INTRAMUSCULAR; INTRAVENOUS at 12:11

## 2017-11-21 NOTE — TRANSFER OF CARE
"Anesthesia Transfer of Care Note    Patient: Swapna Watkins    Procedure(s) Performed: Procedure(s) (LRB):  CHONDROPLASTY-KNEE (Left)  LYSIS-ADHESION (Left)  SYNOVECTOMY-KNEE (Left)  BONE GRAFT; FEMORAL AUTOLOGOUS BONE GRAFTING AND ILIAC CREST (Left)  INJECTION-STEROID; LEFT KNEE AMNIOX (Left)    Patient location: PACU    Anesthesia Type: general    Transport from OR: Transported from OR on 2-3 L/min O2 by NC with adequate spontaneous ventilation    Post pain: adequate analgesia    Post assessment: no apparent anesthetic complications    Post vital signs: stable    Level of consciousness: awake and alert    Nausea/Vomiting: no nausea/vomiting    Complications: none    Transfer of care protocol was followed      Last vitals:   Visit Vitals  BP 94/64 (BP Location: Right arm, Patient Position: Sitting)   Pulse 80   Temp 36.7 °C (98.1 °F) (Oral)   Resp 18   Ht 5' 7" (1.702 m)   Wt 63.5 kg (140 lb)   LMP 11/12/2017 (Exact Date)   SpO2 96%   BMI 21.93 kg/m²     "

## 2017-11-21 NOTE — ANESTHESIA PROCEDURE NOTES
FNB    Patient location during procedure: holding area   Block not for primary anesthetic.  Reason for block: at surgeon's request and post-op pain management   Post-op Pain Location: knee pain  Start time: 11/21/2017 11:16 AM  Timeout: 11/21/2017 11:15 AM   End time: 11/21/2017 11:22 AM  Staffing  Anesthesiologist: KETURAH BRITTON  Performed: anesthesiologist   Preanesthetic Checklist  Completed: patient identified, site marked, surgical consent, pre-op evaluation, timeout performed, IV checked, risks and benefits discussed and monitors and equipment checked     Additional Notes  FEMORAL NERVE BLOCK, L, single shot  Supine position  O2 nasal cannula, sedation titrated  Chloraprep, sterile technique  Monitors: Continuous EKG, pulse Oximeter, Intermittent BP  22G, short-bevel, 3.5 in needle  Ultrasound Guided needle positioning  Local visualized surrounding nerve  Images saved to disc  Muscle twitch elicited at 0.4 mA  Ropivicaine 0.5%, 30cc  Attempt x 1 pass  Negative aspiration every 5 cc  Low injection pressure  Negative paresthesia  Pt. Tolerated procedure well

## 2017-11-21 NOTE — OR NURSING
Patient requested a high block instead of an adductor block.  Reported patient's request to the nurse in  OR room.  Reported patient's request to  via phone.

## 2017-11-21 NOTE — INTERVAL H&P NOTE
The patient has been examined and the H&P has been reviewed:    I concur with the findings and no changes have occurred since H&P was written.    Anesthesia/Surgery risks, benefits and alternative options discussed and understood by patient/family.          Active Hospital Problems    Diagnosis  POA    Acute internal derangement of left knee [M23.92]  Yes      Resolved Hospital Problems    Diagnosis Date Resolved POA   No resolved problems to display.

## 2017-11-21 NOTE — H&P (VIEW-ONLY)
Swapna Watkins  is here for a completion of her perioperative paperwork. she  Is scheduled to undergo 1. Left knee arthroscopic debridement bone tunnel  2. Left knee arthroscopic chondroplasty  3. Left knee arthroscopic synovectomy  4. Left tibia bone tunnel grafting  5. Left femur bone tunnel bone grafting  6. Left iliac crest graft harvest  7. Left knee Amniox arthrocentesis on 11/21/2017.  She is a healthy individual and does not need clearance for this procedure.     Risks, indications and benefits of the surgical procedure were discussed with the patient. All questions with regard to surgery, rehab, expected return to functional activities, activities of daily living and recreational endeavors were answered to her satisfaction.    Once no other questions were asked, a brief history and physical exam was then performed.      PHYSICAL EXAM:  GEN: A&Ox3, WD WN NAD  HEENT: WNL  CHEST: CTAB, no W/R/R  HEART: RRR, no M/R/G  ABD: Soft, NT ND, BS x4 QUADS  MS; See Epic  NEURO: CN II-XII intact       The surgical consent was then reviewed with the patient, who agreed with all the contents of the consent form and it was signed. she was then given the [facility] surgery packet to bring with her to [facility] for the anesthesia portion of her perioperative paperwork.     PHYSICAL THERAPY:  She was also instructed regarding physical therapy and will begin on  POD #1-3 at [facility] in [place].    POST OP CARE:instructions were reviewed including care of the wound and dressing after surgery and when she can shower.     PAIN MANAGEMENT: Swapna Watkins was also given her pain management regime, which includes the TENS unit which she has from previous surgeries along with the education required for its use. She was also instructed regarding the Polar ice unit that will be in place after surgery and her postoperative pain medications.     PAIN MEDICATION:  Percocet 10/325mg 1 po q 4-6 hours prn pain  Phenergan 25 mg one p.o.  q.4-6 hours p.r.n. nausea and vomiting.  ASA 325mg once a day x 6 weeks     As there were no other questions to be asked, she was given my business card along with Washington Deutsch MD business card if she has any questions or concerns prior to surgery or in the postop period.

## 2017-11-21 NOTE — DISCHARGE INSTRUCTIONS
[address], [place]                                                                                          [phone]                   Postoperative Instructions for Knee Surgery                 Your Surgery Included:   Open               Arthroscopic   [] Ligament Repair       [x] Diagnostic           [] ACL     [] PCL     [] MCL     [] PLLC      [] Synovectomy / Plica Removal [] Meniscal Cartilage Repair / Transplantation      [] Lysis of Adhesions / Manipulation [] Articular Cartilage Repair      [] Interval Release           [] Microfracture       [] OATS   [] ACI      [] Meniscectomy           [] Osteochondral Allograft      [] Meniscal Cartilage Repair  [] Patellar Realignment       [] Debridement / Chondroplasty         [] Lateral Release   [] Ligament Repair      [] Articular Cartilage Repair          [] Extensor Mechanism             []   Microfracture  []  OATS         []  Cartilage Biopsy [] Tendon Repair          [] Ligament Reconstruction          [] Patella                  [] Quadriceps             []   ACL    []   PCL  [] High Tibial Osteotomy       [x] Tibial/femoral tunnel bone graft  [] Joint Replacement         [x] Amniox Arthrocentesis           [] Unicompartmental   [] Patellofemoral                    [] Total Knee                  Call our office ([phone]) immediately if you experience any of the following:       Excessive bleeding or pus like drainage at the incision site       Uncontrollable pain not relieved by pain medication       Excessive swelling or redness at the incision site       Fever above 101.5 degrees not controlled with Tylenol or Motrin       Shortness of Breath       Any foul odor or blistering from the surgery site    FOR EMERGENCIES: If any unusual problems or difficulties occur, call our office at [phone], or page the  at [phone] who will direct your call appropriately    1.   Pain Management: A cold  therapy cuff, pain medications, local injections, and in some cases, regional anesthesia injections are used to manage your post-operative pain. The decision to use each of these options is based on their risks and benefits.     Medications: You were given one or more of the following medication prescriptions before leaving the hospital. Have the prescriptions filled at a pharmacy on your way home and follow the instructions on the bottles. If you need a refill, please call your pharmacy.      Narcotic Medication (usually Vicodin ES, Lortab, Percocet or Nucynta): Begin taking the medication before your knee starts to hurt. Some patients do not like to take any medication, but if you wait until your pain is severe before taking, you will be very uncomfortable for several hours waiting for the narcotic to work. Always take with food.     Nausea / Vomiting: For this issue, we prescribe Phenergan, use this medication as directed.     Cold Therapy: You may have been sent home with a [facility] cold therapy unit and wrap for your knee. Fill with ice and water to the indicated fill line and use throughout the day for the first two days and then as needed to help relieve pain and control swelling.      Regional Anesthesia Injections (Blocks): You may have been given a regional nerve block either before or after surgery. This may make your entire leg numb for 24-36 hours.                            * Proceed with caution when bearing weight on your leg.     2.   Diet: Eat a bland diet for the first day after surgery. Progress your diet as tolerated. Constipation may occur with Narcotic usage, contact our office if you are experiencing constipation.    3.   Activity: Limit your activity during the first 48 hours, keep your leg elevated with pillows under your heel. After the first 48 hours at home, increase your activity level based on your symptoms.    4.   Dressing Change: Remove the dressing on the 3rd day. It is normal  for some blood to be seen on the dressings. It is also normal for you to see apparent bruising on the skin around your knee when you remove the dressing. If present, leave the steri-strip tape across the incisions. If you are concerned by the drainage or the appearance of your knee, please call one of the numbers listed below.    5.   Showering: You may shower on the 3rd day after surgery with waterproof bandages over small incisions. If you have an incision with Prineo (clear mesh), it does not need to be covered. Do not submerge in any water until after your postoperative appointment in clinic.    6.   Knee Brace: You may have been sent home in a hinged knee brace. Your brace is set at 0 to 0 degrees of motion. Wear the brace for 4 weeks, LOCKED in full extension when walking, you will need to wear this brace at all time unless instructed otherwise. You may unlock at rest or for exercises.    7.   Your procedure did not require a Continuous Passive Motion (CPM) device.    8.   Weight Bearing: You may have been sent home with crutches. If instructed (see below), use these crutches at all times unless at complete rest.      [] Non-weight bearing for      weeks (you may touch your toes to the floor)      [] Partial weight bearing for   weeks    [] 25% Body Weight   [] 50% Body Weight      [x] Full weight bearing            [x]  NOW    []  after  weeks     9.  Knee Exercises: Begin these exercises the first day after surgery in order to help you regain your motion and strength. You may do the following marked exercises:     [x] Quad Sets - Begin activating your quadriceps muscle by driving your          knee downward into full knee extension while seated on a table or bed   with a towel rolled and propped under your heel     [x] Straight Leg Raise (SLR) - While mary kay your quadriceps muscle, lift     your fully extended leg to the level of your non-operative knee (as shown)     [] Heel Slides - With the knee  "straight, slide your heel slowly toward your   buttocks, hold at the endpoint for 10-15 seconds, then slowly straighten     [x] Ankle pumps - With your knee straight, move your ankle in a "pumping"    fashion to activate your calf and leg muscles      10.  Physical Therapy: Physical therapy is an essential component to your recovery from surgery. Your physical therapy will start in 2 weeks.    FIRST POSTOPERATIVE VISIT: As scheduled.       "

## 2017-11-21 NOTE — BRIEF OP NOTE
[facility]    Brief Operative Note     SUMMARY     Surgery Date: 11/21/2017     Surgeon(s) and Role:     * Washington Deutsch MD - Primary    Assisting Surgeon: None    Pre-op Diagnosis:  Internal derangement of left knee [M23.92]  Rupture of anterior cruciate ligament of left knee, initial encounter [S83.512A]    Post-op Diagnosis:  Post-Op Diagnosis Codes:     * Internal derangement of left knee [M23.92]     * Rupture of anterior cruciate ligament of left knee, initial encounter [S83.512A]    Procedure: Procedure(s) (LRB):  CHONDROPLASTY-KNEE (Left)  LYSIS-ADHESION (Left)  SYNOVECTOMY-KNEE (Left)  BONE GRAFT; FEMORAL AUTOLOGOUS BONE GRAFTING AND ILIAC CREST (Left)  INJECTION-STEROID; LEFT KNEE AMNIOX (Left)    Anesthesia: General    Description of the findings of the procedure: See Dictation    Findings/Key Components: see op note    Estimated Blood Loss: * No values recorded between 11/21/2017 12:23 PM and 11/21/2017  2:03 PM *         Specimens:   Specimen (12h ago through future)    None          Disposition: Patient tolerated the procedure well and was transferred to PACU in stable condition.      Discharge Note    SUMMARY     Admit Date: 11/21/2017    Discharge Date and Time:   11/21/2017 2:03 PM    Pre-op Diagnosis:  Internal derangement of left knee [M23.92]  Rupture of anterior cruciate ligament of left knee, initial encounter [S83.512A]    Post-op Diagnosis:  Post-Op Diagnosis Codes:     * Internal derangement of left knee [M23.92]     * Rupture of anterior cruciate ligament of left knee, initial encounter [S83.512A]    Procedure: Procedure(s) (LRB):  CHONDROPLASTY-KNEE (Left)  LYSIS-ADHESION (Left)  SYNOVECTOMY-KNEE (Left)  BONE GRAFT; FEMORAL AUTOLOGOUS BONE GRAFTING AND ILIAC CREST (Left)  INJECTION-STEROID; LEFT KNEE AMNIOX (Left)    Hospital Course (synopsis of major diagnoses, care, treatment, and services provided during the course of the hospital stay): Patient underwent outpatient knee  surgery and was transferred to PACU in stable condition.  In PACU, patient received appropriate post-operative care and discharged home with plans for physical therapy and follow-up with the operative surgeon.    Diet: Regular       Final Diagnosis: Post-Op Diagnosis Codes:     * Internal derangement of left knee [M23.92]     * Rupture of anterior cruciate ligament of left knee, initial encounter [S83.512A]    Disposition: Home or Self Care    Follow Up/Patient Instructions:     Medications:  Reconciled Home Medications:   Current Discharge Medication List      CONTINUE these medications which have NOT CHANGED    Details   aspirin (ECOTRIN) 325 MG EC tablet Take 1 tablet (325 mg total) by mouth once daily.  Qty: 42 tablet, Refills: 0    Associated Diagnoses: Acute internal derangement of left knee; Rupture of anterior cruciate ligament of left knee, subsequent encounter      celecoxib (CELEBREX) 200 MG capsule Take 1 capsule (200 mg total) by mouth 2 (two) times daily.  Qty: 60 capsule, Refills: 2    Associated Diagnoses: Acute internal derangement of left knee; Rupture of anterior cruciate ligament of left knee, subsequent encounter      fluticasone (FLONASE) 50 mcg/actuation nasal spray 1 spray by Each Nare route once daily.  Refills: 2    Associated Diagnoses: Acute sinusitis, recurrence not specified, unspecified location      oxyCODONE-acetaminophen (PERCOCET)  mg per tablet Take 1 tablet by mouth every 6 (six) hours as needed for Pain.  Qty: 60 tablet, Refills: 0    Associated Diagnoses: Acute internal derangement of left knee; Rupture of anterior cruciate ligament of left knee, subsequent encounter      promethazine (PHENERGAN) 25 MG tablet Take 1 tablet (25 mg total) by mouth every 6 (six) hours as needed for Nausea.  Qty: 30 tablet, Refills: 0    Associated Diagnoses: Acute internal derangement of left knee; Rupture of anterior cruciate ligament of left knee, subsequent encounter             Discharge  "Procedure Orders  CRUTCHES FOR HOME USE   Order Comments: Provide if needed   Order Specific Question Answer Comments   Type: Axillary    Height: 5' 7" (1.702 m)    Weight: 63.5 kg (140 lb)    Does patient have medical equipment at home? none    Length of need (1-99 months): 24      Diet general     Call MD for:  temperature >100.4     Call MD for:  persistent nausea and vomiting     Call MD for:  severe uncontrolled pain     Call MD for:  difficulty breathing, headache or visual disturbances     Call MD for:  redness, tenderness, or signs of infection (pain, swelling, redness, odor or green/yellow discharge around incision site)     Call MD for:  hives     Call MD for:  persistent dizziness or light-headedness     Weight bearing as tolerated     Remove dressing in 72 hours       Follow-up Information     Washington Deutsch MD.    Specialties:  Sports Medicine, Orthopedic Surgery  Why:  as scheduled pre op  Contact information:  [address]  [place] [address]  [phone]                   "

## 2017-11-21 NOTE — PLAN OF CARE
Yelitzacarie Watkins has met all discharge criteria from Phase II. Vital Signs are stable, ambulating  without difficulty. Discharge instructions given, patient verbalized understanding. Discharged from facility via wheelchair in stable condition.

## 2017-11-21 NOTE — ANESTHESIA POSTPROCEDURE EVALUATION
"Anesthesia Post Evaluation    Patient: Swapna Watkins    Procedure(s) Performed: Procedure(s) (LRB):  CHONDROPLASTY-KNEE (Left)  LYSIS-ADHESION (Left)  SYNOVECTOMY-KNEE (Left)  BONE GRAFT; FEMORAL AUTOLOGOUS BONE GRAFTING AND ILIAC CREST (Left)  INJECTION-STEROID; LEFT KNEE AMNIOX (Left)    Final Anesthesia Type: general  Patient location during evaluation: PACU  Patient participation: Yes- Able to Participate  Level of consciousness: awake and alert  Post-procedure vital signs: reviewed and stable  Pain management: adequate  Airway patency: patent  PONV status at discharge: No PONV  Anesthetic complications: no      Cardiovascular status: hemodynamically stable  Respiratory status: unassisted and spontaneous ventilation  Hydration status: euvolemic  Follow-up not needed.        Visit Vitals  BP (!) 98/58   Pulse 80   Temp 36.6 °C (97.8 °F) (Oral)   Resp 18   Ht 5' 7" (1.702 m)   Wt 63.5 kg (140 lb)   LMP 11/12/2017 (Exact Date)   SpO2 98%   BMI 21.93 kg/m²       Pain/Darryl Score: Pain Assessment Performed: Yes (11/21/2017  5:00 PM)  Presence of Pain: complains of pain/discomfort (11/21/2017  5:00 PM)  Pain Rating Prior to Med Admin: 3 ("it's okay") (11/21/2017  3:38 PM)  Pain Rating Post Med Admin: 3 (11/21/2017  5:00 PM)  Darryl Score: 10 (11/21/2017  5:00 PM)      "

## 2017-11-21 NOTE — OR NURSING
Assisted up to bathroom, voided without difficulty, assisted to dress for discharge, tolerated well, discharge instructions reviewed with pt and family members, iv discontinued

## 2017-11-22 ENCOUNTER — TELEPHONE (OUTPATIENT)
Dept: SPORTS MEDICINE | Facility: CLINIC | Age: 32
End: 2017-11-22

## 2017-11-22 NOTE — OP NOTE
DATE OF PROCEDURE:  11/21/2017    ATTENDING SURGEON:  Washington Deutsch M.D.    FIRST ASSISTANT:  Mike Barnhart MD - fellow.    SECOND ASSISTANT:  Ashley Saab PA-C - assistant.    PREOPERATIVE DIAGNOSIS:  Left knee anterior cruciate ligament tear.    POSTOPERATIVE DIAGNOSES:  1.  Left knee anterior cruciate ligament tear.  2.  Left knee failed anterior cruciate ligament reconstruction.  3.  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    OEPRATIVE PROCEDURES PERFORMED:  1.  Left knee arthroscopic complex debridement and removal of soft tissue and   granulation tissue and suture material.  2.  Left knee femoral autologous bone grafting.  3.  Left knee tibial autologous bone grafting.  4.  Left iliac crest bone graft harvest.  5.  Left knee Amniox arthrocentesis.    ANESTHESIA:  General with LMA and local using 30 mL of 0.5% ropivacaine mixture   with concomitant femoral block.    FLUIDS IN THE CASE:  700 mL LR.    COMPLICATIONS:  None.    CONDITION ON RETURN TO RECOVERY ROOM:  Stable.    IMPLANTS UTILIZED:  Clarix HOLLY 100 mL of injectable amniotic fluid x2,   reconstituted with 2 mL normal saline each, Musculoskeletal Transplant   Foundation enhanced demineralized cortical fibers (medium).    INDICATIONS FOR OPERATIVE PROCEDURE:  Vaughn is a 32-year-old female with history   of left knee pain and pathology, status post previous ACL reconstructive   surgery.  She had significant continuing symptoms in the area of concern with   instability and was referred in for an evaluation.  MRI and CT scan evaluation   revealed significant tunnel widening of the femoral and tibial regions, status   post previous all-inside ACL reconstructive surgery.  Based on these findings,   the patient was deemed to be an appropriate candidate for stage reconstruction,   would need to perform autologous bone grafting subsequently in 4 to 6 months.    Once bone tunnels had healed, ACL reconstruction revision surgery performed at   that time.   Complexity of case was increased based on the revision nature of   procedure, change in overall bony anatomy secondary to previous operative   interventions and need to performe complex intraoperative decision making with   change in overall bony anatomy secondary to previous operative treatment.    DESCRIPTION OF PROCEDURE:  The patient was brought into the Operating Room,   placed in supine position.  Upon application of femoral block in the   preoperative hold area, the patient underwent general sedation with placement of   LMA to stabilize the airway.  The patient was given the appropriate dose of   antibiotics based on body weight.  Time-out was utilized to verify left side as   the operative site.  Both upper extremities were placed in comfortable position.    Right leg was carefully padded along the heel and peroneal nerve regions.    Left leg was raised and tourniquet was applied proximally.  Left leg was   examined under anesthesia.    Examination under anesthesia revealed full extension and flexion to 140 degrees.    The patient demonstrated positive pivot shift maneuver of the left knee.    There was a 3B Lachman maneuver noted.  There are no signs of varus or valgus   instability and no signs of rotational instability along the region of concern.    Left leg was then prepped and draped in a sterile fashion with ChloraPrep   material.  Knee was taken into flexion and we instilled 0.5% ropivacaine mixture   into the anterolateral and anteromedial aspects of the knee, applying 10 mL per   portal site.  A #11 blade was used to make these portals.  Blunt trocar and   sheath were inserted into the intercondylar notch of the suprapatellar pouch.    This area was visualized, demonstrating no significant articular cartilage   damage.  There was mild lateral patellar tilt, no patellar subluxation was   noted.  Pictures were obtained.    Attention was turned to the intercondylar notch demonstrating evidence of    significant damage from previous ACL reconstructive surgery with complete damage   to the previous ACL and retained granulation tissue and a retained suture in   the area of concern.  With visualization of the region, we then visualized   medial and lateral compartments.  Medial and lateral compartments were assessed   demonstrating intact medial and lateral meniscus structures, no signs of   articular cartilage damage was noted in either medial or lateral compartments.    Pictures were obtained.  Probe analysis revealed no signs of occult tears in   either the medial or lateral compartment.    Visualization of the femoral and tibial tunnel was then performed demonstrating   the tunnel widening as noted.  We first evaluated the femoral tunnel   demonstrating a scar within the region of concern, which was removed with a   curved 4.2 shaver as well as a Mite[employer] Cavitation VAPR probe and a high-speed bur was used to   remove the irregular segments along the area of concern to allow for   appropriate bleeding in the region of concern.  Pictures were obtained following   demonstration of appropriate bleeding with maintained back wall of the femoral   region.    Attention was turned to the tibial region where we used an arthroscopic assisted   guide from freeze ACL set, also placed a guidepin to it, 1 inch incision along   the anteromedial aspect of the tibia.  Guidepin was placed directly in the   center of the tibial tunnel remnant.  We then overdrilled with a 7.5 drill bit   and then an 8 mm drill bit demonstrating removal of all granulation tissue in   the region of concern.  Using the large 5.5 shaver to remove the granulation   tissue in the region of concern, we spot the high-speed bur.  Appropriate   bleeding was verified.  We then turned our attention to the iliac crest bone   graft harvest.    A 2 cm incision was created along the iliac crest region proximally.  We then   used a Bovie cautery to release fascia along the  region of concern, exposing the   fascia directly over the iliac crest on the left side.  We created a starting   point and then used a reamer to remove bone from the region of concern.  Bone   was removed without significant difficulty obtaining appropriate bone graft for   implantation in the femoral region.  With bone graft removed, we then irrigated   the area of concern.  We placed Gelfoam into the area to stabilize bleeding.  We   then closed the fascial layer with a series of #1 Vicryl sutures placed in   figure-of-eight fashion.  Subcutaneous tissues were closed with 3-0 Vicryl   sutures placed in inverted fashion.  Skin was closed with running 4-0 Monocryl   suture placed in subcuticular fashion along with application of Dermabond   ointment and Prineo tape.    The bone graft was then mixed with 2 mL mixture of saline and Amniox material.    This was placed into the region of concern to create appropriate bleeding in the   region.  With this performed, we took the autologous bone graft mixed with   Amniox and impacted this into the femoral tunnel using the widened anteromedial   portal with the patient's knee in a figure-of-four position.  The portal was   filled with an 8 mm cannula with the diaphragm removed, and we then impacted the   bone graft directly into the femoral tunnel with a bone impactor under direct   arthroscopic visualization.    We then mixed the medium demineralized cortical fibers, which have been opened   with blood and then added Amniox material to this area as well, and with this   one package of Amniox material reconstituted with 2 mL of normal saline and   mixed with the blood and cortical fibers.  Under direct arthroscopic   visualization, we then impacted this into the tibial tunnel, which had been   created along the anteromedial aspect of the tibia, this was impacted up to the   subchondral bone plate level and visualized intraoperatively.    With this visualized appropriately,  both femoral and tibial tunnels bone graft   appropriately, we then sealed the edge with application to seal which had been   mixed, this is an Epi-Seal product.  This was applied directly into the femoral   tunnel site to maintain bone graft control and over the tibial tunnel site to   maintain bone graft control.  With this performed, we took final pictures.    Fluid was extravasated from the joint.  A spinal needle was placed under direct   arthroscopic visualization into the knee prior to removal of arthroscopic   instrumentation.  We then closed the arthroscopic portals with a series of 4-0   nylon sutures.  We then aspirated the knee one final time to remove blood   elements and then injected 2 mL of normal saline reconstituted with Clarix HOLLY   100 mL injectable material.  The needle was removed.  We applied Xeroform,   gauze, ABD pads, cast padding, long-leg DREAD hose stocking and cooling unit along   with sterile electrode pads proximally and distally.  The patient's knee was   placed into a cooling unit and then hinged knee immobilizer was locked in -10   degrees hyperextension and allowed to flex at rest to 120 degrees.  The patient   was allowed to recover from the anesthetic.  LMA was removed.  The patient was   taken to Recovery Room in stable condition.  At the completion of the case, all   instrument and sponge counts were correct.    NOTE:  Dr. Washington Deutsch was present for the key portions of the procedure and did   perform the key parts the procedure.    PHYSICAL THERAPY:  The patient should begin physical therapy 1 to 3 days   following the surgery.    WEIGHTBEARING STATUS:  Full weightbearing as tolerated status.    RANGE OF MOTION:  Full range of motion at rest with knee immobilizer while at   rest, initiated on postoperative day #3.    IMMOBILIZER:  Immobilizer should be locked in extension with gait for the first   2 weeks following the surgery and they can be weaned out of the immobilizer    after 2 weeks.  The patient should avoid high impact type exercise for the first   3 months to 4 months following surgery until ACL revision surgery has been   performed.      MAGGY  dd: 11/21/2017 21:51:35 (CST)  td: 11/22/2017 05:08:35 (CST)  Doc ID   #[id number]  Job ID #[id number]    CC: [facility]

## 2017-11-22 NOTE — TELEPHONE ENCOUNTER
Patient called and had questions pertaining to her surgery on Tuesday.     1. Use TENs unit as needed for pain management  2. Ice 5-6 times a day for 20 minutes minimum  3. Brace locked out at -10 for 4 weeks and WBAT    All of the patient's questions were answered and the patient will contact us if they have any questions or concerns in the interim.

## 2017-11-24 ENCOUNTER — PATIENT MESSAGE (OUTPATIENT)
Dept: SPORTS MEDICINE | Facility: CLINIC | Age: 32
End: 2017-11-24

## 2017-12-04 ENCOUNTER — OFFICE VISIT (OUTPATIENT)
Dept: SPORTS MEDICINE | Facility: CLINIC | Age: 32
End: 2017-12-04
Payer: COMMERCIAL

## 2017-12-04 VITALS
BODY MASS INDEX: 23.54 KG/M2 | SYSTOLIC BLOOD PRESSURE: 121 MMHG | DIASTOLIC BLOOD PRESSURE: 65 MMHG | WEIGHT: 150 LBS | HEIGHT: 67 IN | HEART RATE: 109 BPM

## 2017-12-04 DIAGNOSIS — M25.562 CHRONIC PAIN OF LEFT KNEE: ICD-10-CM

## 2017-12-04 DIAGNOSIS — M85.60 BONE CYST: ICD-10-CM

## 2017-12-04 DIAGNOSIS — M23.92 ACUTE INTERNAL DERANGEMENT OF LEFT KNEE: ICD-10-CM

## 2017-12-04 DIAGNOSIS — S83.512D RUPTURE OF ANTERIOR CRUCIATE LIGAMENT OF LEFT KNEE, SUBSEQUENT ENCOUNTER: ICD-10-CM

## 2017-12-04 DIAGNOSIS — Z98.890 S/P ACL RECONSTRUCTION: ICD-10-CM

## 2017-12-04 DIAGNOSIS — G89.29 CHRONIC PAIN OF LEFT KNEE: ICD-10-CM

## 2017-12-04 DIAGNOSIS — M23.92 INTERNAL DERANGEMENT OF KNEE JOINT, LEFT: ICD-10-CM

## 2017-12-04 DIAGNOSIS — S83.512A RUPTURE OF ANTERIOR CRUCIATE LIGAMENT OF LEFT KNEE, INITIAL ENCOUNTER: Primary | Chronic | ICD-10-CM

## 2017-12-04 PROCEDURE — 99024 POSTOP FOLLOW-UP VISIT: CPT | Mod: S$GLB,,, | Performed by: ORTHOPAEDIC SURGERY

## 2017-12-04 PROCEDURE — 99999 PR PBB SHADOW E&M-EST. PATIENT-LVL III: CPT | Mod: PBBFAC,,, | Performed by: ORTHOPAEDIC SURGERY

## 2017-12-04 RX ORDER — PROMETHAZINE HYDROCHLORIDE 25 MG/1
25 TABLET ORAL EVERY 6 HOURS PRN
Qty: 60 TABLET | Refills: 0 | Status: SHIPPED | OUTPATIENT
Start: 2017-12-04 | End: 2018-01-03

## 2017-12-04 RX ORDER — OXYCODONE AND ACETAMINOPHEN 10; 325 MG/1; MG/1
1 TABLET ORAL EVERY 6 HOURS PRN
Qty: 60 TABLET | Refills: 0 | Status: SHIPPED | OUTPATIENT
Start: 2017-12-04 | End: 2018-03-15 | Stop reason: SDUPTHER

## 2017-12-04 NOTE — PROGRESS NOTES
Subjective:          Chief Complaint: Swapna Watkins is a 32 y.o. female who had concerns including Pain of the Left Knee.    Patient is here for a follow up for her left knee to review her CT scan.    Has MRI from 10/26/16 showing ACL graft rupture.     History of L ACL reconstruction 12/6/11 by Dr. Luke.  She recently underwent bone grafting of the femoral and tibial lesions with ICBG. She is doing well and wants to remove the immobilizer.      Handedness: right-handed  Sport played:    Level:            Pain   The current episode started more than 1 year ago. The problem has been gradually improving. Pertinent negatives include no chest pain, fever, joint swelling, numbness or rash.       Review of Systems   Constitution: Negative for fever and night sweats.   HENT: Negative for hearing loss.    Eyes: Negative for blurred vision and visual disturbance.   Cardiovascular: Negative for chest pain and leg swelling.   Respiratory: Negative for shortness of breath.    Endocrine: Negative for polyuria.   Hematologic/Lymphatic: Negative for bleeding problem.   Skin: Negative for rash.   Musculoskeletal: Positive for joint pain. Negative for back pain, joint swelling, muscle cramps and muscle weakness.   Gastrointestinal: Negative for melena.   Genitourinary: Negative for hematuria.   Neurological: Negative for loss of balance, numbness and paresthesias.   Psychiatric/Behavioral: Negative for altered mental status.       Pain Related Questions  Over the past 3 days, what was your average pain during activity? (I.e. running, jogging, walking, climbing stairs, getting dressed, ect.): 10  Over the past 3 days, what was your highest pain level?: 10  Over the past 3 days, what was your lowest pain level? : 3    Other  How many nights a week are you awakened by your affected body part?: 2  Was the patient's HEIGHT measured or patient reported?: Patient Reported  Was the patient's WEIGHT measured or patient reported?: Patient  Reported      Objective:        General: Swapna is well-developed, well-nourished, appears stated age, in no acute distress, alert and oriented to time, place and person.     General    Vitals reviewed.  Constitutional: She is oriented to person, place, and time. She appears well-developed and well-nourished. No distress.   HENT:   Mouth/Throat: No oropharyngeal exudate.   Eyes: Right eye exhibits no discharge. Left eye exhibits no discharge.   Neck: Normal range of motion.   Pulmonary/Chest: Effort normal and breath sounds normal. No respiratory distress.   Neurological: She is alert and oriented to person, place, and time. She has normal reflexes. No cranial nerve deficit. Coordination normal.   Psychiatric: She has a normal mood and affect. Her behavior is normal. Judgment and thought content normal.     General Musculoskeletal Exam   Gait: normal       Right Knee Exam     Inspection   Erythema: absent  Scars: absent  Swelling: absent  Effusion: effusion  Deformity: deformity  Bruising: absent    Tenderness   The patient is experiencing no tenderness.         Range of Motion   Extension: -5   Flexion: 150     Tests   Meniscus   Shayna:  Medial - negative Lateral - negative  Ligament Examination Lachman: normal (-1 to 2mm) PCL-Posterior Drawer: normal (0 to 2mm)     MCL - Valgus: normal (0 to 2mm)  LCL - Varus: normalPivot Shift: normal (Equal)Reverse Pivot Shift: normal (Equal)Dial Test at 30 degrees: normal (< 5 degrees)Dial Test at 90 degrees: normal (< 5 degrees)  Posterior Sag Test: negative  Posterolateral Corner: unstable (>15 degrees difference)  Patella   Patellar Apprehension: negative  Passive Patellar Tilt: neutral  Patellar Tracking: normal  Patellar Glide (quadrants): Lateral - 1   Medial - 2  Q-Angle at 90 degrees: normal  Patellar Grind: negative  J-Sign: none    Other   Meniscal Cyst: absent  Popliteal (Baker's) Cyst: absent  Sensation: normal    Left Knee Exam     Inspection   Erythema:  absent  Scars: present  Swelling: present  Effusion: absent  Deformity: deformity  Bruising: absent    Tenderness   The patient tender to palpation of the lateral joint line.    Range of Motion   Extension:  0 abnormal   Flexion:  90 abnormal     Tests   Meniscus   Shayna:  Medial - negative Lateral - negative  Stability Lachman: normal (-1 to 2mm) PCL-Posterior Drawer: normal (0 to 2mm)  MCL - Valgus: normal (0 to 2mm)  LCL - Varus: normal (0 to 2mm)Pivot Shift: normal (Equal)Reverse Pivot Shift: normal (Equal)Dial Test at 30 degrees: normal (< 5 degrees)Dial Test at 90 degrees: normal (< 5 degrees)  Posterior Sag Test: negative  Posterolateral Corner: unstable (>15 degrees difference)  Patella   Patellar Apprehension: negative  Passive Patellar Tilt: neutral  Patellar Tracking: normal  Patellar Glide (Quadrants): Lateral - 1 Medial - 2  Q-Angle at 90 degrees: normal  Patellar Grind: negative  J-Sign: J sign absent    Other   Meniscal Cyst: absent  Popliteal (Baker's) Cyst: absent  Sensation: normal    Right Hip Exam     Tests   Heather: negative  Left Hip Exam     Tests   Heather: negative          Muscle Strength   Right Lower Extremity   Hip Abduction: 5/5   Quadriceps:  5/5   Hamstrin/5   Left Lower Extremity   Hip Abduction: 5/5   Quadriceps:  4/5   Hamstrin/5     Reflexes     Left Side  Quadriceps:  2+  Achilles:  2+    Right Side   Quadriceps:  2+  Achilles:  2+    Vascular Exam     Right Pulses  Dorsalis Pedis:      2+  Posterior Tibial:      2+        Left Pulses  Dorsalis Pedis:      2+  Posterior Tibial:      2+        MRI 10/26/16:    There is a complete rupture of the ACL graft and there is associated osteolysis    CT Lower:   17 mm x 17 mm tibial bone cyst  21 x 11 mm femoral bone cyst            Assessment:       Encounter Diagnoses   Name Primary?    Rupture of anterior cruciate ligament of left knee, initial encounter Yes    S/P ACL reconstruction     Chronic pain of left knee      Internal derangement of knee joint, left     Acute internal derangement of left knee     Bone cyst     Rupture of anterior cruciate ligament of left knee, subsequent encounter           Plan:       1. IKDC, SF-12 and KOOS was filled out today in clinic.     RTC in 4 weeks with Dr. Washington Deutsch for CT review. Patient will not fill out IKDC, SF-12 and KOOS on return.    2. Sutures removed today and and steri-strips applied today with benzoin tincture.    3. We reviewed with Swapna today, the pathology and natural history of her diagnosis. We have discussed a variety of treatment options including medications, physical therapy and other alternative treatments. I also explained the indications, risks and benefits of surgery. After discussion, Swapna decided to proceed with surgery. The decision was made to go forward with     Stage 2  1. Left knee ACL reconstruction with hamstring autograft (double bundle)  2. Left knee Amniox arthrocentesis      The details of the surgical procedure were explained, including the location of probable incisions and a description of likely hardware and/or grafts to be used.  The patient understands the likely convalescence after surgery.  Also, we have thoroughly discussed the risks, benefits and alternatives to surgery, including, but not limited to, the risk of infection, joint stiffness, blood clot (including DVT and/or pulmonary embolus), neurologic and vascular injury.  It was explained that, if tissue has been repaired or reconstructed, there is a chance of failure, which may require further management.      All of the patient's questions were answered and informed consent was obtained. The patient will contact us if they have any questions or concerns in the interim.                    [facility] patient questionnaires have been collected today.

## 2017-12-28 ENCOUNTER — OFFICE VISIT (OUTPATIENT)
Dept: URGENT CARE | Facility: CLINIC | Age: 32
End: 2017-12-28
Payer: COMMERCIAL

## 2017-12-28 VITALS
RESPIRATION RATE: 16 BRPM | BODY MASS INDEX: 22.76 KG/M2 | SYSTOLIC BLOOD PRESSURE: 109 MMHG | OXYGEN SATURATION: 100 % | DIASTOLIC BLOOD PRESSURE: 71 MMHG | HEIGHT: 67 IN | WEIGHT: 145 LBS | TEMPERATURE: 98 F | HEART RATE: 91 BPM

## 2017-12-28 DIAGNOSIS — J10.1 INFLUENZA B: Primary | ICD-10-CM

## 2017-12-28 DIAGNOSIS — R05.9 COUGH: ICD-10-CM

## 2017-12-28 LAB
CTP QC/QA: YES
FLUAV AG NPH QL: NEGATIVE
FLUBV AG NPH QL: POSITIVE

## 2017-12-28 PROCEDURE — 99203 OFFICE O/P NEW LOW 30 MIN: CPT | Mod: 25,S$GLB,, | Performed by: PHYSICIAN ASSISTANT

## 2017-12-28 PROCEDURE — 87804 INFLUENZA ASSAY W/OPTIC: CPT | Mod: 59,QW,S$GLB, | Performed by: PHYSICIAN ASSISTANT

## 2017-12-28 PROCEDURE — 96372 THER/PROPH/DIAG INJ SC/IM: CPT | Mod: S$GLB,,, | Performed by: EMERGENCY MEDICINE

## 2017-12-28 RX ORDER — BETAMETHASONE SODIUM PHOSPHATE AND BETAMETHASONE ACETATE 3; 3 MG/ML; MG/ML
9 INJECTION, SUSPENSION INTRA-ARTICULAR; INTRALESIONAL; INTRAMUSCULAR; SOFT TISSUE ONCE
Status: COMPLETED | OUTPATIENT
Start: 2017-12-28 | End: 2017-12-28

## 2017-12-28 RX ORDER — OSELTAMIVIR PHOSPHATE 75 MG/1
75 CAPSULE ORAL 2 TIMES DAILY
Qty: 10 CAPSULE | Refills: 0 | Status: SHIPPED | OUTPATIENT
Start: 2017-12-28 | End: 2018-01-02

## 2017-12-28 RX ORDER — BENZONATATE 200 MG/1
200 CAPSULE ORAL 3 TIMES DAILY PRN
Qty: 30 CAPSULE | Refills: 0 | Status: SHIPPED | OUTPATIENT
Start: 2017-12-28 | End: 2018-01-07

## 2017-12-28 RX ORDER — PROMETHAZINE HYDROCHLORIDE AND CODEINE PHOSPHATE 6.25; 1 MG/5ML; MG/5ML
5 SOLUTION ORAL EVERY 6 HOURS PRN
Qty: 118 ML | Refills: 0 | Status: SHIPPED | OUTPATIENT
Start: 2017-12-28 | End: 2018-01-02

## 2017-12-28 RX ADMIN — BETAMETHASONE SODIUM PHOSPHATE AND BETAMETHASONE ACETATE 9 MG: 3; 3 INJECTION, SUSPENSION INTRA-ARTICULAR; INTRALESIONAL; INTRAMUSCULAR; SOFT TISSUE at 06:12

## 2017-12-28 NOTE — LETTER
December 28, 2017      [facility] [address], [address]  [facility] [address]  Phone: [phone]  Fax: [phone]       Patient: Swapna Watkins   YOB: 1985  Date of Visit: 12/28/2017    To Whom It May Concern:    Mable Watkins  was at [facility] on 12/28/2017. She may return to work/school on 01/02/2018 with no restrictions. If you have any questions or concerns, or if I can be of further assistance, please do not hesitate to contact me.    Sincerely,       Chip Del Rosario PA-C

## 2017-12-28 NOTE — PROGRESS NOTES
"Subjective:       Patient ID: Swapna Watkins is a 32 y.o. female.    Vitals:  height is 5' 7" (1.702 m) and weight is 65.8 kg (145 lb). Her temperature is 98.2 °F (36.8 °C). Her blood pressure is 109/71 and her pulse is 91. Her respiration is 16 and oxygen saturation is 100%.     Chief Complaint: No chief complaint on file.    URI    This is a new problem. The current episode started yesterday. The problem has been gradually worsening. Associated symptoms include congestion, coughing, headaches and a sore throat. Pertinent negatives include no abdominal pain, chest pain, diarrhea, ear pain, nausea, neck pain, rash, vomiting or wheezing.     Review of Systems   Constitution: Positive for malaise/fatigue. Negative for chills and fever.   HENT: Positive for congestion and sore throat. Negative for ear pain and hoarse voice.    Eyes: Negative for blurred vision, discharge, pain, redness and visual disturbance.   Cardiovascular: Negative for chest pain, dyspnea on exertion, leg swelling, near-syncope and syncope.   Respiratory: Positive for cough and sputum production. Negative for shortness of breath and wheezing.    Hematologic/Lymphatic: Negative for adenopathy.   Skin: Negative for itching and rash.   Musculoskeletal: Positive for myalgias. Negative for back pain, neck pain and stiffness.   Gastrointestinal: Negative for abdominal pain, diarrhea, nausea and vomiting.   Neurological: Positive for headaches. Negative for dizziness, light-headedness and numbness.   Psychiatric/Behavioral: Negative for altered mental status.   Allergic/Immunologic: Negative for hives.   All other systems reviewed and are negative.      Objective:      Physical Exam   Constitutional: She is oriented to person, place, and time. She appears well-developed and well-nourished.  Non-toxic appearance. She has a sickly appearance. She appears ill. No distress.   HENT:   Head: Normocephalic and atraumatic.   Right Ear: Hearing, tympanic " membrane, external ear and ear canal normal.   Left Ear: Hearing, tympanic membrane, external ear and ear canal normal.   Nose: No mucosal edema. No epistaxis. Right sinus exhibits no maxillary sinus tenderness and no frontal sinus tenderness. Left sinus exhibits no maxillary sinus tenderness and no frontal sinus tenderness.   Mouth/Throat: Uvula is midline and mucous membranes are normal. No uvula swelling. Posterior oropharyngeal erythema present. No oropharyngeal exudate.   Bilateral clear nasal congestion and erythema    Eyes: Pupils are equal, round, and reactive to light.   Neck: Normal range of motion. Neck supple.   Cardiovascular: Normal rate, regular rhythm and normal heart sounds.  Exam reveals no gallop and no friction rub.    No murmur heard.  Pulmonary/Chest: Effort normal. No respiratory distress. She has no decreased breath sounds. She has no wheezes. She has rhonchi (mild) in the right middle field and the left middle field. She has no rales.   Musculoskeletal: Normal range of motion.   Lymphadenopathy:        Head (right side): No submental, no submandibular, no preauricular, no posterior auricular and no occipital adenopathy present.        Head (left side): No submental, no submandibular, no preauricular, no posterior auricular and no occipital adenopathy present.     She has no cervical adenopathy.        Right cervical: No posterior cervical adenopathy present.       Left cervical: No posterior cervical adenopathy present.        Right: No supraclavicular adenopathy present.        Left: No supraclavicular adenopathy present.   Neurological: She is alert and oriented to person, place, and time. She is not disoriented. Coordination and gait normal.   Skin: No abrasion, no ecchymosis, no laceration and no rash noted. No erythema.   Psychiatric: She has a normal mood and affect. Her behavior is normal.   Nursing note and vitals reviewed.      Assessment:       1. Influenza B    2. Cough         Plan:         Influenza B  -     oseltamivir (TAMIFLU) 75 MG capsule; Take 1 capsule (75 mg total) by mouth 2 (two) times daily.  Dispense: 10 capsule; Refill: 0  -     promethazine-codeine 6.25-10 mg/5 ml (PHENERGAN WITH CODEINE) 6.25-10 mg/5 mL syrup; Take 5 mLs by mouth every 6 (six) hours as needed for Cough.  Dispense: 118 mL; Refill: 0  -     betamethasone acetate-betamethasone sodium phosphate injection 9 mg; Inject 1.5 mLs (9 mg total) into the muscle once.  -     benzonatate (TESSALON) 200 MG capsule; Take 1 capsule (200 mg total) by mouth 3 (three) times daily as needed for Cough.  Dispense: 30 capsule; Refill: 0    Cough  -     POCT Influenza A/B

## 2017-12-29 NOTE — PATIENT INSTRUCTIONS
- Please return here or go to the Emergency Department for any concerns or worsening of condition.   - If you were prescribed a narcotic medication, do not drive or operate heavy equipment or machinery while taking these medications.  - Please follow up with your primary care provider (PCP) or discussed specialist(s) as needed.         Influenza (Adult)    Influenza is also called the flu. It is a viral illness that affects the air passages of your lungs. It is different from the common cold. The flu can easily be passed from one to person to another. It may be spread through the air by coughing and sneezing. Or it can be spread by touching the sick person and then touching your own eyes, nose, or mouth.  The flu starts 1 to 3 days after you are exposed to the flu virus. It may last for 1 to 2 weeks but many people feel tired or fatigued for many weeks afterward. You usually dont need to take antibiotics unless you have a complication. This might be an ear or sinus infection or pneumonia.  Symptoms of the flu may be mild or severe. They can include extreme tiredness (wanting to stay in bed all day), chills, fevers, muscle aches, soreness with eye movement, headache, and a dry, hacking cough.  Home care  Follow these guidelines when caring for yourself at home:  · Avoid being around cigarette smoke, whether yours or other peoples.  · Acetaminophen or ibuprofen will help ease your fever, muscle aches, and headache. Dont give aspirin to anyone younger than 18 who has the flu. Aspirin can harm the liver.  · Nausea and loss of appetite are common with the flu. Eat light meals. Drink 6 to 8 glasses of liquids every day. Good choices are water, sport drinks, soft drinks without caffeine, juices, tea, and soup. Extra fluids will also help loosen secretions in your nose and lungs.  · Over-the-counter cold medicines will not make the flu go away faster. But the medicines may help with coughing, sore throat, and congestion  in your nose and sinuses. Dont use a decongestant if you have high blood pressure.  · Stay home until your fever has been gone for at least 24 hours without using medicine to reduce fever.  Follow-up care  Follow up with your healthcare provider, or as advised, if you are not getting better over the next week.  If you are age 65 or older, talk with your provider about getting a pneumococcal vaccine every 5 years. You should also get this vaccine if you have chronic asthma or COPD. All adults should get a flu vaccine every fall. Ask your provider about this.  When to seek medical advice  Call your healthcare provider right away if any of these occur:  · Cough with lots of colored mucus (sputum) or blood in your mucus  · Chest pain, shortness of breath, wheezing, or trouble breathing  · Severe headache, or face, neck, or ear pain  · New rash with fever  · Fever of 100.4°F (38°C) or higher, or as directed by your healthcare provider  · Confusion, behavior change, or seizure  · Severe weakness or dizziness  · You get a new fever or cough after getting better for a few days  Date Last Reviewed: 1/1/2017  © 1651-9874 [employer], [employer]. [address] [place] [address], [place] [address]. All rights reserved. This information is not intended as a substitute for professional medical care. Always follow your healthcare professional's instructions.

## 2018-01-03 ENCOUNTER — HOSPITAL ENCOUNTER (OUTPATIENT)
Dept: RADIOLOGY | Facility: HOSPITAL | Age: 33
Discharge: HOME | End: 2018-01-03
Attending: ORTHOPAEDIC SURGERY
Payer: COMMERCIAL

## 2018-01-03 ENCOUNTER — OFFICE VISIT (OUTPATIENT)
Dept: SPORTS MEDICINE | Facility: CLINIC | Age: 33
End: 2018-01-03
Payer: COMMERCIAL

## 2018-01-03 VITALS
HEIGHT: 67 IN | BODY MASS INDEX: 22.76 KG/M2 | WEIGHT: 145 LBS | SYSTOLIC BLOOD PRESSURE: 119 MMHG | HEART RATE: 65 BPM | DIASTOLIC BLOOD PRESSURE: 80 MMHG

## 2018-01-03 DIAGNOSIS — M23.92 ACUTE INTERNAL DERANGEMENT OF LEFT KNEE: ICD-10-CM

## 2018-01-03 DIAGNOSIS — S83.512S RUPTURE OF ANTERIOR CRUCIATE LIGAMENT OF LEFT KNEE, SEQUELA: Chronic | ICD-10-CM

## 2018-01-03 DIAGNOSIS — M25.562 ACUTE PAIN OF LEFT KNEE: ICD-10-CM

## 2018-01-03 DIAGNOSIS — M25.562 ACUTE PAIN OF LEFT KNEE: Primary | ICD-10-CM

## 2018-01-03 DIAGNOSIS — M23.92 INTERNAL DERANGEMENT OF KNEE JOINT, LEFT: ICD-10-CM

## 2018-01-03 PROCEDURE — 73564 X-RAY EXAM KNEE 4 OR MORE: CPT | Mod: 26,50,FY, | Performed by: RADIOLOGY

## 2018-01-03 PROCEDURE — 73564 X-RAY EXAM KNEE 4 OR MORE: CPT | Mod: TC,50,FY,PO

## 2018-01-03 PROCEDURE — 99999 PR PBB SHADOW E&M-EST. PATIENT-LVL III: CPT | Mod: PBBFAC,,, | Performed by: ORTHOPAEDIC SURGERY

## 2018-01-03 PROCEDURE — 99024 POSTOP FOLLOW-UP VISIT: CPT | Mod: S$GLB,,, | Performed by: ORTHOPAEDIC SURGERY

## 2018-01-03 NOTE — PROGRESS NOTES
Subjective:          Chief Complaint: Swapna Watkins is a 32 y.o. female who had concerns including Post-op Evaluation of the Left Knee.    Patient is here for a follow up for her left knee.     DATE OF PROCEDURE:  11/21/2017     ATTENDING SURGEON:  Washington Deutsch M.D.     FIRST ASSISTANT:  Mike Barnhart MD - fellow.     SECOND ASSISTANT:  Ashley Saab PA-C - assistant.     PREOPERATIVE DIAGNOSIS:  Left knee anterior cruciate ligament tear.     POSTOPERATIVE DIAGNOSES:  1.  Left knee anterior cruciate ligament tear.  2.  Left knee failed anterior cruciate ligament reconstruction.  3.  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     OEPRATIVE PROCEDURES PERFORMED:  1.  Left knee arthroscopic complex debridement and removal of soft tissue and   granulation tissue and suture material.  2.  Left knee femoral autologous bone grafting.  3.  Left knee tibial autologous bone grafting.  4.  Left iliac crest bone graft harvest.  5.  Left knee Amniox arthrocentesis.    Has MRI from 10/26/16 showing ACL graft rupture.     History of L ACL reconstruction 12/6/11 by Dr. Luke.  She recently underwent bone grafting of the femoral and tibial lesions with ICBG. She is doing well and wants to remove the immobilizer.      Handedness: right-handed  Sport played:    Level:            Pain   The current episode started more than 1 year ago. The problem has been gradually improving. Pertinent negatives include no chest pain, fever, joint swelling, numbness or rash.       Review of Systems   Constitution: Negative for fever and night sweats.   HENT: Negative for hearing loss.    Eyes: Negative for blurred vision and visual disturbance.   Cardiovascular: Negative for chest pain and leg swelling.   Respiratory: Negative for shortness of breath.    Endocrine: Negative for polyuria.   Hematologic/Lymphatic: Negative for bleeding problem.   Skin: Negative for rash.   Musculoskeletal: Positive for joint pain. Negative for back pain, joint  swelling, muscle cramps and muscle weakness.   Gastrointestinal: Negative for melena.   Genitourinary: Negative for hematuria.   Neurological: Negative for loss of balance, numbness and paresthesias.   Psychiatric/Behavioral: Negative for altered mental status.       Pain Related Questions  Over the past 3 days, what was your average pain during activity? (I.e. running, jogging, walking, climbing stairs, getting dressed, ect.): 8  Over the past 3 days, what was your highest pain level?: 8  Over the past 3 days, what was your lowest pain level? : 3    Other  How many nights a week are you awakened by your affected body part?: 7  Was the patient's HEIGHT measured or patient reported?: Patient Reported  Was the patient's WEIGHT measured or patient reported?: Measured      Objective:        General: Swapna is well-developed, well-nourished, appears stated age, in no acute distress, alert and oriented to time, place and person.     General    Vitals reviewed.  Constitutional: She is oriented to person, place, and time. She appears well-developed and well-nourished. No distress.   HENT:   Mouth/Throat: No oropharyngeal exudate.   Eyes: Right eye exhibits no discharge. Left eye exhibits no discharge.   Neck: Normal range of motion.   Pulmonary/Chest: Effort normal and breath sounds normal. No respiratory distress.   Neurological: She is alert and oriented to person, place, and time. She has normal reflexes. No cranial nerve deficit. Coordination normal.   Psychiatric: She has a normal mood and affect. Her behavior is normal. Judgment and thought content normal.     General Musculoskeletal Exam   Gait: normal       Right Knee Exam     Inspection   Erythema: absent  Scars: absent  Swelling: absent  Effusion: effusion  Deformity: deformity  Bruising: absent    Tenderness   The patient is experiencing no tenderness.         Range of Motion   Extension: -5   Flexion: 140     Tests   Meniscus   Shayna:  Medial - negative  Lateral - negative  Ligament Examination Lachman: normal (-1 to 2mm) PCL-Posterior Drawer: normal (0 to 2mm)     MCL - Valgus: normal (0 to 2mm)  LCL - Varus: normalPivot Shift: normal (Equal)Reverse Pivot Shift: normal (Equal)Dial Test at 30 degrees: normal (< 5 degrees)Dial Test at 90 degrees: normal (< 5 degrees)  Posterior Sag Test: negative  Posterolateral Corner: unstable (>15 degrees difference)  Patella   Patellar Apprehension: negative  Passive Patellar Tilt: neutral  Patellar Tracking: normal  Patellar Glide (quadrants): Lateral - 1   Medial - 2  Q-Angle at 90 degrees: normal  Patellar Grind: negative  J-Sign: none    Other   Meniscal Cyst: absent  Popliteal (Baker's) Cyst: absent  Sensation: normal    Left Knee Exam     Inspection   Erythema: absent  Scars: present  Swelling: present  Effusion: absent  Deformity: deformity  Bruising: absent    Tenderness   The patient tender to palpation of the lateral joint line.    Range of Motion   Extension:  0 abnormal   Flexion:  120 abnormal     Tests   Meniscus   Shayna:  Medial - negative Lateral - negative  Stability Lachman: normal (-1 to 2mm) PCL-Posterior Drawer: normal (0 to 2mm)  MCL - Valgus: normal (0 to 2mm)  LCL - Varus: normal (0 to 2mm)Pivot Shift: normal (Equal)Reverse Pivot Shift: normal (Equal)Dial Test at 30 degrees: normal (< 5 degrees)Dial Test at 90 degrees: normal (< 5 degrees)  Posterior Sag Test: negative  Posterolateral Corner: unstable (>15 degrees difference)  Patella   Patellar Apprehension: negative  Passive Patellar Tilt: neutral  Patellar Tracking: normal  Patellar Glide (Quadrants): Lateral - 1 Medial - 2  Q-Angle at 90 degrees: normal  Patellar Grind: negative  J-Sign: J sign absent    Other   Meniscal Cyst: absent  Popliteal (Baker's) Cyst: absent  Sensation: normal    Right Hip Exam     Tests   Heather: negative  Left Hip Exam     Tests   Heather: negative          Muscle Strength   Right Lower Extremity   Hip Abduction: 5/5    Quadriceps:  5/5   Hamstrin/5   Left Lower Extremity   Hip Abduction: 5/5   Quadriceps:  4/5   Hamstrin/5     Reflexes     Left Side  Quadriceps:  2+  Achilles:  2+    Right Side   Quadriceps:  2+  Achilles:  2+    Vascular Exam     Right Pulses  Dorsalis Pedis:      2+  Posterior Tibial:      2+        Left Pulses  Dorsalis Pedis:      2+  Posterior Tibial:      2+        RADIOGRAPHS TODAY  Radiographs ordered and reviewed today in clinic of the bilateral knee demonstrates post-operative changes left knee ACL .                   Assessment:       Encounter Diagnoses   Name Primary?    Acute pain of left knee Yes    Rupture of anterior cruciate ligament of left knee, sequela     Internal derangement of knee joint, left     Acute internal derangement of left knee           Plan:       1. IKDC, SF-12 and KOOS was not filled out today in clinic.     RTC in 6 weeks with Dr. Washington Deutsch Patient will fill out IKDC, SF-12 and KOOS and bilateral knee series on return.    2. Bone stimulator provided today - with Javi    3. We reviewed with Swapna today, the pathology and natural history of her diagnosis. We have discussed a variety of treatment options including medications, physical therapy and other alternative treatments. I also explained the indications, risks and benefits of surgery. After discussion, Swapna decided to proceed with surgery. The decision was made to go forward with     Stage 2  1. Left knee ACL reconstruction with hamstring autograft (double bundle)  2. Left knee Amniox arthrocentesis    PLAN FOR 2018      The details of the surgical procedure were explained, including the location of probable incisions and a description of likely hardware and/or grafts to be used.  The patient understands the likely convalescence after surgery.  Also, we have thoroughly discussed the risks, benefits and alternatives to surgery, including, but not limited to, the risk of infection, joint stiffness,  blood clot (including DVT and/or pulmonary embolus), neurologic and vascular injury.  It was explained that, if tissue has been repaired or reconstructed, there is a chance of failure, which may require further management.      All of the patient's questions were answered and informed consent was obtained. The patient will contact us if they have any questions or concerns in the interim.                    [facility] patient questionnaires have been collected today.

## 2018-02-26 ENCOUNTER — TELEPHONE (OUTPATIENT)
Dept: SPORTS MEDICINE | Facility: CLINIC | Age: 33
End: 2018-02-26

## 2018-02-26 ENCOUNTER — PATIENT MESSAGE (OUTPATIENT)
Dept: SPORTS MEDICINE | Facility: CLINIC | Age: 33
End: 2018-02-26

## 2018-02-26 NOTE — TELEPHONE ENCOUNTER
----- Message from Beatriz Peter MA sent at 2/26/2018 12:47 PM CST -----  Contact: self      ----- Message -----  From: Ree Chambers  Sent: 2/26/2018  12:39 PM  To: [facility] Staff    Pt called wanting to schedule her surgery with Dr. Deutsch on her left knee. Pt would like a call back and could be reached at [phone]

## 2018-03-01 DIAGNOSIS — S83.512A RUPTURE OF ANTERIOR CRUCIATE LIGAMENT OF LEFT KNEE, INITIAL ENCOUNTER: Primary | ICD-10-CM

## 2018-03-15 ENCOUNTER — OFFICE VISIT (OUTPATIENT)
Dept: SPORTS MEDICINE | Facility: CLINIC | Age: 33
End: 2018-03-15
Payer: COMMERCIAL

## 2018-03-15 ENCOUNTER — HOSPITAL ENCOUNTER (OUTPATIENT)
Dept: PREADMISSION TESTING | Facility: OTHER | Age: 33
Discharge: HOME | End: 2018-03-15
Attending: ORTHOPAEDIC SURGERY
Payer: COMMERCIAL

## 2018-03-15 ENCOUNTER — ANESTHESIA EVENT (OUTPATIENT)
Dept: SURGERY | Facility: OTHER | Age: 33
End: 2018-03-15
Payer: COMMERCIAL

## 2018-03-15 VITALS
BODY MASS INDEX: 22.76 KG/M2 | TEMPERATURE: 97 F | HEART RATE: 80 BPM | SYSTOLIC BLOOD PRESSURE: 101 MMHG | DIASTOLIC BLOOD PRESSURE: 60 MMHG | OXYGEN SATURATION: 96 % | WEIGHT: 145 LBS | HEIGHT: 67 IN

## 2018-03-15 VITALS
HEIGHT: 67 IN | SYSTOLIC BLOOD PRESSURE: 111 MMHG | WEIGHT: 145 LBS | HEART RATE: 102 BPM | BODY MASS INDEX: 22.76 KG/M2 | DIASTOLIC BLOOD PRESSURE: 76 MMHG

## 2018-03-15 DIAGNOSIS — M23.92 ACUTE INTERNAL DERANGEMENT OF LEFT KNEE: ICD-10-CM

## 2018-03-15 DIAGNOSIS — S83.512D RUPTURE OF ANTERIOR CRUCIATE LIGAMENT OF LEFT KNEE, SUBSEQUENT ENCOUNTER: ICD-10-CM

## 2018-03-15 PROCEDURE — 99999 PR PBB SHADOW E&M-EST. PATIENT-LVL III: CPT | Mod: PBBFAC,,, | Performed by: PHYSICIAN ASSISTANT

## 2018-03-15 PROCEDURE — 99499 UNLISTED E&M SERVICE: CPT | Mod: S$GLB,,, | Performed by: PHYSICIAN ASSISTANT

## 2018-03-15 RX ORDER — LIDOCAINE HYDROCHLORIDE 10 MG/ML
0.5 INJECTION, SOLUTION EPIDURAL; INFILTRATION; INTRACAUDAL; PERINEURAL ONCE
Status: CANCELLED | OUTPATIENT
Start: 2018-03-15 | End: 2018-03-15

## 2018-03-15 RX ORDER — CELECOXIB 200 MG/1
200 CAPSULE ORAL 2 TIMES DAILY
Qty: 60 CAPSULE | Refills: 2 | Status: SHIPPED | OUTPATIENT
Start: 2018-03-15 | End: 2019-07-17

## 2018-03-15 RX ORDER — SODIUM CHLORIDE, SODIUM LACTATE, POTASSIUM CHLORIDE, CALCIUM CHLORIDE 600; 310; 30; 20 MG/100ML; MG/100ML; MG/100ML; MG/100ML
INJECTION, SOLUTION INTRAVENOUS CONTINUOUS
Status: CANCELLED | OUTPATIENT
Start: 2018-03-15

## 2018-03-15 RX ORDER — PHENTERMINE HYDROCHLORIDE 37.5 MG/1
37.5 TABLET ORAL
COMMUNITY
End: 2021-02-09

## 2018-03-15 RX ORDER — MIDAZOLAM HYDROCHLORIDE 5 MG/ML
4 INJECTION INTRAMUSCULAR; INTRAVENOUS
Status: CANCELLED | OUTPATIENT
Start: 2018-03-15

## 2018-03-15 RX ORDER — PROMETHAZINE HYDROCHLORIDE 25 MG/1
25 TABLET ORAL EVERY 6 HOURS PRN
Qty: 30 TABLET | Refills: 0 | Status: SHIPPED | OUTPATIENT
Start: 2018-03-15 | End: 2018-04-14

## 2018-03-15 RX ORDER — ASPIRIN 325 MG
325 TABLET, DELAYED RELEASE (ENTERIC COATED) ORAL DAILY
Qty: 42 TABLET | Refills: 0 | Status: SHIPPED | OUTPATIENT
Start: 2018-03-15 | End: 2019-07-17

## 2018-03-15 RX ORDER — OXYCODONE AND ACETAMINOPHEN 10; 325 MG/1; MG/1
1 TABLET ORAL EVERY 6 HOURS PRN
Qty: 40 TABLET | Refills: 0 | Status: SHIPPED | OUTPATIENT
Start: 2018-03-15 | End: 2018-04-02 | Stop reason: SDUPTHER

## 2018-03-15 RX ORDER — FAMOTIDINE 20 MG/1
20 TABLET, FILM COATED ORAL
Status: CANCELLED | OUTPATIENT
Start: 2018-03-15 | End: 2018-03-15

## 2018-03-15 RX ORDER — OXYCODONE HYDROCHLORIDE 5 MG/1
5 TABLET ORAL ONCE AS NEEDED
Status: CANCELLED | OUTPATIENT
Start: 2018-03-15 | End: 2018-03-15

## 2018-03-15 NOTE — ANESTHESIA PREPROCEDURE EVALUATION
03/15/2018  Swapna Watkins is a 33 y.o., female.    Anesthesia Evaluation    I have reviewed the Patient Summary Reports.    I have reviewed the Nursing Notes.   I have reviewed the Medications.     Review of Systems  Anesthesia Hx:  No problems with previous Anesthesia  Denies Family Hx of Anesthesia complications.   Denies Personal Hx of Anesthesia complications.   Social:  Smoker 1/2 ppd   Hematology/Oncology:  Hematology Normal   Oncology Normal     EENT/Dental:EENT/Dental Normal   Cardiovascular:  Cardiovascular Normal Exercise tolerance: good     Pulmonary:  Pulmonary Normal    Renal/:  Renal/ Normal     Hepatic/GI:  Hepatic/GI Normal    Musculoskeletal:  Musculoskeletal Normal    Neurological:  Neurology Normal    Endocrine:  Endocrine Normal    Dermatological:  Skin Normal    Psych:  Psychiatric Normal           Physical Exam  General:  Well nourished    Airway/Jaw/Neck:  Airway Findings: Mouth Opening: Normal Mallampati: I  TM Distance: Normal, at least 6 cm      Dental:  Dental Findings: In tact        Mental Status:  Mental Status Findings:  Cooperative, Alert and Oriented         Anesthesia Plan  Type of Anesthesia, risks & benefits discussed:  Anesthesia Type:  general  Patient's Preference:   Intra-op Monitoring Plan: standard ASA monitors  Intra-op Monitoring Plan Comments:   Post Op Pain Control Plan: peripheral nerve block  Post Op Pain Control Plan Comments:   Induction:    Beta Blocker:         Informed Consent: Patient understands risks and agrees with Anesthesia plan.  Questions answered. Anesthesia consent signed with patient.  ASA Score: 2     Day of Surgery Review of History & Physical:    H&P update referred to the surgeon.     Anesthesia Plan Notes: No labs.     Very anxious, ordered IM versed            Ready For Surgery From Anesthesia Perspective.

## 2018-03-15 NOTE — DISCHARGE INSTRUCTIONS
PRE-ADMIT TESTING -  [phone]    [address]  [facility]          Your surgery has been scheduled at [facility]. We are pleased to have the opportunity to serve you. For Further Information please call [phone].    On the day of surgery please report to the Information Desk on the 1st floor.    · CONTACT YOUR PHYSICIAN'S OFFICE THE DAY PRIOR TO YOUR SURGERY TO OBTAIN YOUR ARRIVAL TIME.     · The evening before surgery do not eat anything after 9 p.m. ( this includes hard candy, chewing gum and mints).  You may only have GATORADE, POWERADE AND WATER  from 9 p.m. until you leave your home.   DO NOT DRINK ANY LIQUIDS ON THE WAY TO THE HOSPITAL.      SPECIAL MEDICATION INSTRUCTIONS: TAKE medications checked off by the Anesthesiologist on your Medication List.    Angiogram Patients: Take medications as instructed by your physician, including aspirin.     Surgery Patients:    If you take ASPIRIN - Your PHYSICIAN/SURGEON will need to inform you IF/OR when you need to stop taking aspirin prior to your surgery.     Do Not take any medications containing IBUPROFEN.  Do Not Wear any make-up or dark nail polish   (especially eye make-up) to surgery. If you come to surgery with makeup on you will be required to remove the makeup or nail polish.    Do not shave your surgical area at least 5 days prior to your surgery. The surgical prep will be performed at the hospital according to Infection Control regulations.    Leave all valuables at home.   Do Not wear any jewelry or watches, including any metal in body piercings.  Contact Lens must be removed before surgery. Either do not wear the contact lens or bring a case and solution for storage.  Please bring a container for eyeglasses or dentures as required.  Bring any paperwork your physician has provided, such as consent forms,  history and physicals, doctor's orders, etc.   Bring comfortable clothes that are loose fitting to wear upon  discharge. Take into consideration the type of surgery being performed.  Maintain your diet as advised per your physician the day prior to surgery.      Adequate rest the night before surgery is advised.   Park in the Parking lot behind the hospital or in the [facility] across the street from the parking lot. Parking is complimentary.  If you will be discharged the same day as your procedure, please arrange for a responsible adult to drive you home or to accompany you if traveling by taxi.   YOU WILL NOT BE PERMITTED TO DRIVE OR TO LEAVE THE HOSPITAL ALONE AFTER SURGERY.   It is strongly recommended that you arrange for someone to remain with you for the first 24 hrs following your surgery.       Thank you for your cooperation.  The Staff of [facility].        Bathing Instructions                                                                 Please shower the evening before and morning of your procedure with    ANTIBACTERIAL SOAP. ( DIAL, etc )  Concentrate on the surgical area   for at least 3 minutes and rinse completely. Dry off as usual.   Do not use any deodorant, powder, body lotions, perfume, after shave or    cologne.

## 2018-03-15 NOTE — H&P
Swapna Watkins  is here for a completion of her perioperative paperwork. she  Is scheduled to undergo 1. Left knee ACL reconstruction with hamstring autograft (double bundle)  2. Left knee Amniox arthrocentesis on 3/20/2018.  She is a healthy individual and does not need clearance for this procedure.     Risks, indications and benefits of the surgical procedure were discussed with the patient. All questions with regard to surgery, rehab, expected return to functional activities, activities of daily living and recreational endeavors were answered to her satisfaction.    Once no other questions were asked, a brief history and physical exam was then performed.      PHYSICAL EXAM:  GEN: A&Ox3, WD WN NAD  HEENT: WNL  CHEST: CTAB, no W/R/R  HEART: RRR, no M/R/G  ABD: Soft, NT ND, BS x4 QUADS  MS; See Epic  NEURO: CN II-XII intact       The surgical consent was then reviewed with the patient, who agreed with all the contents of the consent form and it was signed. she was then given the [facility] surgery packet to bring with her to [facility] for the anesthesia portion of her perioperative paperwork.     PHYSICAL THERAPY:  She was also instructed regarding physical therapy and will begin on  POD #1-3 at [facility] in [place].    POST OP CARE:instructions were reviewed including care of the wound and dressing after surgery and when she can shower.     PAIN MANAGEMENT: Swapna Watkins was also given her pain management regime, which includes the TENS unit which she received from Eduardo Philip along with the education required for its use. She was also instructed regarding the Polar ice unit that will be in place after surgery and her postoperative pain medications.     PAIN MEDICATION:  Percocet 10/325mg 1 po q 4-6 hours prn pain  Phenergan 25 mg one p.o. q.4-6 hours p.r.n. nausea and vomiting.  ASA 325mg once a day x 6 weeks  Celebrex 200mg BID    As there were no other questions to be asked, she was given my business card along  with Washington Deutsch MD business card if she has any questions or concerns prior to surgery or in the postop period.

## 2018-03-15 NOTE — PRE-PROCEDURE INSTRUCTIONS
Reviewed  knee arthroscopy discharge instructions and demonstrated the postoperative equipment (polar ice), with verbalized understanding per patient.  The patient has crutches and is experienced with the use of crutches.                         .

## 2018-03-20 ENCOUNTER — HOSPITAL ENCOUNTER (OUTPATIENT)
Facility: OTHER | Age: 33
Discharge: HOME | End: 2018-03-20
Attending: ORTHOPAEDIC SURGERY | Admitting: ORTHOPAEDIC SURGERY
Payer: COMMERCIAL

## 2018-03-20 ENCOUNTER — ANESTHESIA (OUTPATIENT)
Dept: SURGERY | Facility: OTHER | Age: 33
End: 2018-03-20
Payer: COMMERCIAL

## 2018-03-20 ENCOUNTER — SURGERY (OUTPATIENT)
Age: 33
End: 2018-03-20

## 2018-03-20 VITALS
WEIGHT: 145 LBS | OXYGEN SATURATION: 98 % | TEMPERATURE: 98 F | BODY MASS INDEX: 22.76 KG/M2 | SYSTOLIC BLOOD PRESSURE: 111 MMHG | HEART RATE: 65 BPM | HEIGHT: 67 IN | DIASTOLIC BLOOD PRESSURE: 62 MMHG | RESPIRATION RATE: 16 BRPM

## 2018-03-20 DIAGNOSIS — M23.92 ACUTE INTERNAL DERANGEMENT OF LEFT KNEE: ICD-10-CM

## 2018-03-20 DIAGNOSIS — S83.512D RUPTURE OF ANTERIOR CRUCIATE LIGAMENT OF LEFT KNEE, SUBSEQUENT ENCOUNTER: ICD-10-CM

## 2018-03-20 LAB
B-HCG UR QL: NEGATIVE
CTP QC/QA: YES

## 2018-03-20 PROCEDURE — 63600175 PHARM REV CODE 636 W HCPCS: Performed by: SPECIALIST

## 2018-03-20 PROCEDURE — 63600175 PHARM REV CODE 636 W HCPCS: Performed by: NURSE ANESTHETIST, CERTIFIED REGISTERED

## 2018-03-20 PROCEDURE — 71000015 HC POSTOP RECOV 1ST HR: Performed by: ORTHOPAEDIC SURGERY

## 2018-03-20 PROCEDURE — 63600175 PHARM REV CODE 636 W HCPCS: Performed by: ANESTHESIOLOGY

## 2018-03-20 PROCEDURE — C1713 ANCHOR/SCREW BN/BN,TIS/BN: HCPCS | Performed by: ORTHOPAEDIC SURGERY

## 2018-03-20 PROCEDURE — 71000016 HC POSTOP RECOV ADDL HR: Performed by: ORTHOPAEDIC SURGERY

## 2018-03-20 PROCEDURE — 37000008 HC ANESTHESIA 1ST 15 MINUTES: Performed by: ORTHOPAEDIC SURGERY

## 2018-03-20 PROCEDURE — 25000003 PHARM REV CODE 250: Performed by: ORTHOPAEDIC SURGERY

## 2018-03-20 PROCEDURE — 25000003 PHARM REV CODE 250: Performed by: ANESTHESIOLOGY

## 2018-03-20 PROCEDURE — 25000003 PHARM REV CODE 250: Performed by: SPECIALIST

## 2018-03-20 PROCEDURE — 71000033 HC RECOVERY, INTIAL HOUR: Performed by: ORTHOPAEDIC SURGERY

## 2018-03-20 PROCEDURE — 25000003 PHARM REV CODE 250: Performed by: NURSE ANESTHETIST, CERTIFIED REGISTERED

## 2018-03-20 PROCEDURE — 63600175 PHARM REV CODE 636 W HCPCS: Performed by: ORTHOPAEDIC SURGERY

## 2018-03-20 PROCEDURE — 27201423 OPTIME MED/SURG SUP & DEVICES STERILE SUPPLY: Performed by: ORTHOPAEDIC SURGERY

## 2018-03-20 PROCEDURE — 71000039 HC RECOVERY, EACH ADD'L HOUR: Performed by: ORTHOPAEDIC SURGERY

## 2018-03-20 PROCEDURE — 36000710: Performed by: ORTHOPAEDIC SURGERY

## 2018-03-20 PROCEDURE — 63600175 PHARM REV CODE 636 W HCPCS: Performed by: PHYSICIAN ASSISTANT

## 2018-03-20 PROCEDURE — V2790 AMNIOTIC MEMBRANE: HCPCS | Performed by: ORTHOPAEDIC SURGERY

## 2018-03-20 PROCEDURE — 37000009 HC ANESTHESIA EA ADD 15 MINS: Performed by: ORTHOPAEDIC SURGERY

## 2018-03-20 PROCEDURE — C1769 GUIDE WIRE: HCPCS | Performed by: ORTHOPAEDIC SURGERY

## 2018-03-20 PROCEDURE — 36000711: Performed by: ORTHOPAEDIC SURGERY

## 2018-03-20 PROCEDURE — 81025 URINE PREGNANCY TEST: CPT | Performed by: PHYSICIAN ASSISTANT

## 2018-03-20 PROCEDURE — 29888 ARTHRS AID ACL RPR/AGMNTJ: CPT | Mod: LT,,, | Performed by: ORTHOPAEDIC SURGERY

## 2018-03-20 PROCEDURE — 17999 UNLISTD PX SKN MUC MEMB SUBQ: CPT | Mod: ,,, | Performed by: ORTHOPAEDIC SURGERY

## 2018-03-20 DEVICE — GUIDE GRAFTMAX XACTPIN FLEX: Type: IMPLANTABLE DEVICE | Site: KNEE | Status: FUNCTIONAL

## 2018-03-20 DEVICE — SHEATH SCREW LARGE 30MM: Type: IMPLANTABLE DEVICE | Site: KNEE | Status: FUNCTIONAL

## 2018-03-20 DEVICE — SCREW BIOINTRAFIX 7-9MMX30: Type: IMPLANTABLE DEVICE | Site: KNEE | Status: FUNCTIONAL

## 2018-03-20 DEVICE — MATRIX REGENERATIVE CLARIX FLO: Type: IMPLANTABLE DEVICE | Site: KNEE | Status: FUNCTIONAL

## 2018-03-20 DEVICE — LOOP CORT RIGID FIX ADJ STD: Type: IMPLANTABLE DEVICE | Site: KNEE | Status: FUNCTIONAL

## 2018-03-20 RX ORDER — OXYCODONE HYDROCHLORIDE 5 MG/1
10 TABLET ORAL EVERY 4 HOURS PRN
Status: DISCONTINUED | OUTPATIENT
Start: 2018-03-20 | End: 2018-03-20 | Stop reason: HOSPADM

## 2018-03-20 RX ORDER — OXYCODONE HYDROCHLORIDE 5 MG/1
5 TABLET ORAL ONCE AS NEEDED
Status: DISCONTINUED | OUTPATIENT
Start: 2018-03-20 | End: 2018-03-20 | Stop reason: HOSPADM

## 2018-03-20 RX ORDER — PROPOFOL 10 MG/ML
VIAL (ML) INTRAVENOUS
Status: DISCONTINUED | OUTPATIENT
Start: 2018-03-20 | End: 2018-03-20

## 2018-03-20 RX ORDER — CEFAZOLIN SODIUM 1 G/3ML
2 INJECTION, POWDER, FOR SOLUTION INTRAMUSCULAR; INTRAVENOUS
Status: DISCONTINUED | OUTPATIENT
Start: 2018-03-20 | End: 2018-03-20 | Stop reason: HOSPADM

## 2018-03-20 RX ORDER — LIDOCAINE HCL/PF 100 MG/5ML
SYRINGE (ML) INTRAVENOUS
Status: DISCONTINUED | OUTPATIENT
Start: 2018-03-20 | End: 2018-03-20

## 2018-03-20 RX ORDER — ONDANSETRON 2 MG/ML
4 INJECTION INTRAMUSCULAR; INTRAVENOUS DAILY PRN
Status: DISCONTINUED | OUTPATIENT
Start: 2018-03-20 | End: 2018-03-20 | Stop reason: HOSPADM

## 2018-03-20 RX ORDER — MIDAZOLAM HYDROCHLORIDE 1 MG/ML
2 INJECTION INTRAMUSCULAR; INTRAVENOUS EVERY 5 MIN PRN
Status: DISCONTINUED | OUTPATIENT
Start: 2018-03-20 | End: 2018-03-20 | Stop reason: HOSPADM

## 2018-03-20 RX ORDER — ONDANSETRON 2 MG/ML
4 INJECTION INTRAMUSCULAR; INTRAVENOUS EVERY 12 HOURS PRN
Status: DISCONTINUED | OUTPATIENT
Start: 2018-03-20 | End: 2018-03-20 | Stop reason: HOSPADM

## 2018-03-20 RX ORDER — MORPHINE SULFATE 10 MG/ML
2 INJECTION INTRAMUSCULAR; INTRAVENOUS; SUBCUTANEOUS EVERY 4 HOURS PRN
Status: DISCONTINUED | OUTPATIENT
Start: 2018-03-20 | End: 2018-03-20 | Stop reason: HOSPADM

## 2018-03-20 RX ORDER — HYDROMORPHONE HYDROCHLORIDE 2 MG/ML
0.4 INJECTION, SOLUTION INTRAMUSCULAR; INTRAVENOUS; SUBCUTANEOUS EVERY 5 MIN PRN
Status: COMPLETED | OUTPATIENT
Start: 2018-03-20 | End: 2018-03-20

## 2018-03-20 RX ORDER — MIDAZOLAM HYDROCHLORIDE 5 MG/ML
4 INJECTION INTRAMUSCULAR; INTRAVENOUS
Status: DISCONTINUED | OUTPATIENT
Start: 2018-03-20 | End: 2018-03-20 | Stop reason: HOSPADM

## 2018-03-20 RX ORDER — FENTANYL CITRATE 50 UG/ML
100 INJECTION, SOLUTION INTRAMUSCULAR; INTRAVENOUS EVERY 5 MIN PRN
Status: COMPLETED | OUTPATIENT
Start: 2018-03-20 | End: 2018-03-20

## 2018-03-20 RX ORDER — OXYCODONE HYDROCHLORIDE 5 MG/1
5 TABLET ORAL
Status: DISCONTINUED | OUTPATIENT
Start: 2018-03-20 | End: 2018-03-20 | Stop reason: HOSPADM

## 2018-03-20 RX ORDER — FENTANYL CITRATE 50 UG/ML
INJECTION, SOLUTION INTRAMUSCULAR; INTRAVENOUS
Status: DISCONTINUED | OUTPATIENT
Start: 2018-03-20 | End: 2018-03-20

## 2018-03-20 RX ORDER — LIDOCAINE HYDROCHLORIDE 10 MG/ML
0.5 INJECTION, SOLUTION EPIDURAL; INFILTRATION; INTRACAUDAL; PERINEURAL ONCE
Status: DISCONTINUED | OUTPATIENT
Start: 2018-03-20 | End: 2018-03-20 | Stop reason: HOSPADM

## 2018-03-20 RX ORDER — ROPIVACAINE HYDROCHLORIDE 5 MG/ML
INJECTION, SOLUTION EPIDURAL; INFILTRATION; PERINEURAL
Status: DISCONTINUED | OUTPATIENT
Start: 2018-03-20 | End: 2018-03-20

## 2018-03-20 RX ORDER — FAMOTIDINE 20 MG/1
20 TABLET, FILM COATED ORAL
Status: COMPLETED | OUTPATIENT
Start: 2018-03-20 | End: 2018-03-20

## 2018-03-20 RX ORDER — MEPERIDINE HYDROCHLORIDE 50 MG/ML
12.5 INJECTION INTRAMUSCULAR; INTRAVENOUS; SUBCUTANEOUS ONCE AS NEEDED
Status: COMPLETED | OUTPATIENT
Start: 2018-03-20 | End: 2018-03-20

## 2018-03-20 RX ORDER — ONDANSETRON 2 MG/ML
INJECTION INTRAMUSCULAR; INTRAVENOUS
Status: DISCONTINUED | OUTPATIENT
Start: 2018-03-20 | End: 2018-03-20

## 2018-03-20 RX ORDER — SODIUM CHLORIDE, SODIUM LACTATE, POTASSIUM CHLORIDE, CALCIUM CHLORIDE 600; 310; 30; 20 MG/100ML; MG/100ML; MG/100ML; MG/100ML
INJECTION, SOLUTION INTRAVENOUS CONTINUOUS
Status: DISCONTINUED | OUTPATIENT
Start: 2018-03-20 | End: 2018-03-20 | Stop reason: HOSPADM

## 2018-03-20 RX ORDER — PROMETHAZINE HYDROCHLORIDE 25 MG/1
25 TABLET ORAL EVERY 6 HOURS PRN
Status: DISCONTINUED | OUTPATIENT
Start: 2018-03-20 | End: 2018-03-20 | Stop reason: HOSPADM

## 2018-03-20 RX ORDER — FENTANYL CITRATE 50 UG/ML
25 INJECTION, SOLUTION INTRAMUSCULAR; INTRAVENOUS EVERY 5 MIN PRN
Status: COMPLETED | OUTPATIENT
Start: 2018-03-20 | End: 2018-03-20

## 2018-03-20 RX ORDER — SODIUM CHLORIDE 0.9 % (FLUSH) 0.9 %
3 SYRINGE (ML) INJECTION
Status: DISCONTINUED | OUTPATIENT
Start: 2018-03-20 | End: 2018-03-20 | Stop reason: HOSPADM

## 2018-03-20 RX ADMIN — FENTANYL CITRATE 25 MCG: 50 INJECTION INTRAMUSCULAR; INTRAVENOUS at 02:03

## 2018-03-20 RX ADMIN — HYDROMORPHONE HYDROCHLORIDE 0.4 MG: 2 INJECTION, SOLUTION INTRAMUSCULAR; INTRAVENOUS; SUBCUTANEOUS at 03:03

## 2018-03-20 RX ADMIN — MIDAZOLAM HYDROCHLORIDE 2 MG: 1 INJECTION INTRAMUSCULAR; INTRAVENOUS at 10:03

## 2018-03-20 RX ADMIN — FENTANYL CITRATE 50 MCG: 50 INJECTION, SOLUTION INTRAMUSCULAR; INTRAVENOUS at 12:03

## 2018-03-20 RX ADMIN — FAMOTIDINE 20 MG: 20 TABLET, FILM COATED ORAL at 09:03

## 2018-03-20 RX ADMIN — HYDROMORPHONE HYDROCHLORIDE 0.4 MG: 2 INJECTION, SOLUTION INTRAMUSCULAR; INTRAVENOUS; SUBCUTANEOUS at 02:03

## 2018-03-20 RX ADMIN — CARBOXYMETHYLCELLULOSE SODIUM 2 DROP: 2.5 SOLUTION/ DROPS OPHTHALMIC at 11:03

## 2018-03-20 RX ADMIN — MIDAZOLAM HYDROCHLORIDE 4 MG: 5 INJECTION, SOLUTION INTRAMUSCULAR; INTRAVENOUS at 10:03

## 2018-03-20 RX ADMIN — PROMETHAZINE HYDROCHLORIDE 25 MG: 25 TABLET ORAL at 04:03

## 2018-03-20 RX ADMIN — LIDOCAINE HYDROCHLORIDE 50 MG: 20 INJECTION, SOLUTION INTRAVENOUS at 11:03

## 2018-03-20 RX ADMIN — ONDANSETRON 4 MG: 2 INJECTION INTRAMUSCULAR; INTRAVENOUS at 03:03

## 2018-03-20 RX ADMIN — Medication 1 G: at 12:03

## 2018-03-20 RX ADMIN — SODIUM CHLORIDE, SODIUM LACTATE, POTASSIUM CHLORIDE, AND CALCIUM CHLORIDE: 600; 310; 30; 20 INJECTION, SOLUTION INTRAVENOUS at 10:03

## 2018-03-20 RX ADMIN — FENTANYL CITRATE 100 MCG: 50 INJECTION, SOLUTION INTRAMUSCULAR; INTRAVENOUS at 11:03

## 2018-03-20 RX ADMIN — ROPIVACAINE HYDROCHLORIDE 25 ML: 5 INJECTION, SOLUTION EPIDURAL; INFILTRATION; PERINEURAL at 10:03

## 2018-03-20 RX ADMIN — PROPOFOL 200 MG: 10 INJECTION, EMULSION INTRAVENOUS at 11:03

## 2018-03-20 RX ADMIN — MEPERIDINE HYDROCHLORIDE 12.5 MG: 50 INJECTION INTRAMUSCULAR; INTRAVENOUS; SUBCUTANEOUS at 02:03

## 2018-03-20 RX ADMIN — FENTANYL CITRATE 100 MCG: 50 INJECTION, SOLUTION INTRAMUSCULAR; INTRAVENOUS at 10:03

## 2018-03-20 RX ADMIN — OXYCODONE HYDROCHLORIDE 5 MG: 5 TABLET ORAL at 02:03

## 2018-03-20 RX ADMIN — CEFAZOLIN 2 G: 330 INJECTION, POWDER, FOR SOLUTION INTRAMUSCULAR; INTRAVENOUS at 12:03

## 2018-03-20 RX ADMIN — ONDANSETRON 4 MG: 2 INJECTION INTRAMUSCULAR; INTRAVENOUS at 01:03

## 2018-03-20 NOTE — DISCHARGE INSTRUCTIONS
Anesthesia: After Your Surgery  Youve just had surgery. During surgery, you received medication called anesthesia to keep you comfortable and pain-free. After surgery, you may experience some pain or nausea. This is common. Here are some tips for feeling better and recovering after surgery.    Going home  Your doctor or nurse will show you how to take care of yourself when you go home. He or she will also answer your questions. Have an adult family member or friend drive you home. For the first 24 hours after your surgery:  · Do not drive or use heavy equipment.  · Do not make important decisions or sign legal documents.  · Avoid alcohol.  · Have someone stay with you, if needed. He or she can watch for problems and help keep you safe.  Be sure to keep all follow-up appointments with your doctor. And rest after your procedure for as long as your doctor tells you to.    Coping with pain  If you have pain after surgery, pain medication will help you feel better. Take it as directed, before pain becomes severe. Also, ask your doctor or pharmacist about other ways to control pain, such as with heat, ice, and relaxation. And follow any other instructions your surgeon or nurse gives you.    Tips for taking pain medication  To get the best relief possible, remember these points:  · Pain medications can upset your stomach. Taking them with a little food may help.  · Most pain relievers taken by mouth need at least 20 to 30 minutes to take effect.  · Taking medication on a schedule can help you remember to take it. Try to time your medication so that you can take it before beginning an activity, such as dressing, walking, or sitting down for dinner.  · Constipation is a common side effect of pain medications. Contact your doctor before taking any medications like laxatives or stool softeners to help relieve constipation. Also ask about any dietary restrictions, because drinking lots of fluids and eating foods like fruits  and vegetables that are high in fiber can also help. Remember, dont take laxatives unless your surgeon has prescribed them.  · Mixing alcohol and pain medication can cause dizziness and slow your breathing. It can even be fatal. Dont drink alcohol while taking pain medication.  · Pain medication can slow your reflexes. Dont drive or operate machinery while taking pain medication.  If your health care provider tells you to take acetaminophen to help relieve your pain, ask him or her how much you are supposed to take each day. (Acetaminophen is the generic name for Tylenol and other brand-name pain relievers.) Acetaminophen or other pain relievers may interact with your prescription medicines or other over-the-counter (OTC) drugs. Some prescription medications contain acetaminophen along with other active ingredients. Using both prescription and OTC acetaminophen for pain can cause you to overdose. The FDA recommends that you read the labels on your OTC medications carefully. This will help you to clearly understand the list of active ingredients, dosing instructions, and any warnings. It may also help you avoid taking too much acetaminophen. If you have questions or don't understand the information, ask your pharmacist or health care provider to explain it to you before you take the OTC medication.    Managing nausea  Some people have an upset stomach after surgery. This is often due to anesthesia, pain, pain medications, or the stress of surgery. The following tips will help you manage nausea and get good nutrition as you recover. If you were on a special diet before surgery, ask your doctor if you should follow it during recovery. These tips may help:  · Dont push yourself to eat. Your body will tell you when to eat and how much.  · Start off with clear liquids and soup. They are easier to digest.  · Progress to semi-solid foods (mashed potatoes, applesauce, and gelatin) as you feel ready.  · Slowly move to  solid foods. Dont eat fatty, rich, or spicy foods at first.  · Dont force yourself to have three large meals a day. Instead, eat smaller amounts more often.  · Take pain medications with a small amount of solid food, such as crackers or toast to avoid nausea.      Call your surgeon if  · You still have pain an hour after taking medication (it may not be strong enough).  · You feel too sleepy, dizzy, or groggy (medication may be too strong).  · You have side effects like nausea, vomiting, or skin changes (rash, itching, or hives).   © 8138-4450 [employer]. [address] [place] [address], [place] [address]. All rights reserved. This information is not intended as a substitute for professional medical care. Always follow your healthcare professional's instructions.                     [facility] [address], [place]                                                                                          [phone]                   Postoperative Instructions for Knee Surgery                 Your Surgery Included:   Open               Arthroscopic   [] Ligament Repair       [x] Diagnostic           [] ACL     [] PCL     [] MCL     [] PLLC      [] Synovectomy / Plica Removal [] Meniscal Cartilage Repair / Transplantation      [] Lysis of Adhesions / Manipulation [] Articular Cartilage Repair      [] Interval Release           [] Microfracture       [] OATS   [] ACI      [] Meniscectomy           [] Osteochondral Allograft      [] Meniscal Cartilage Repair  [] Patellar Realignment       [] Debridement / Chondroplasty         [] Lateral Release   [] Ligament Repair      [] Articular Cartilage Repair          [] Extensor Mechanism             []   Microfracture  []  OATS         []  Cartilage Biopsy [] Tendon Repair          [x] Ligament Reconstruction          [] Patella                  [] Quadriceps             [x]   ACL    []   PCL  [] High Tibial Osteotomy       [] PRP Arthrocentesis  []  Joint Replacement         [x] Amniox Arthrocentesis           [] Unicompartmental   [] Patellofemoral                    [] Total Knee                  Call our office ([phone]) immediately if you experience any of the following:       Excessive bleeding or pus like drainage at the incision site       Uncontrollable pain not relieved by pain medication       Excessive swelling or redness at the incision site       Fever above 101.5 degrees not controlled with Tylenol or Motrin       Shortness of Breath       Any foul odor or blistering from the surgery site    FOR EMERGENCIES: If any unusual problems or difficulties occur, call our office at [phone], or page the  at [phone] who will direct your call appropriately    1.   Pain Management: A cold therapy cuff, pain medications, local injections, and in some cases, regional anesthesia injections are used to manage your post-operative pain. The decision to use each of these options is based on their risks and benefits.     Medications: You were given one or more of the following medication prescriptions before leaving the hospital. Have the prescriptions filled at a pharmacy on your way home and follow the instructions on the bottles. If you need a refill, please call your pharmacy.      Narcotic Medication (usually Vicodin ES, Lortab, Percocet or Nucynta): Begin taking the medication before your knee starts to hurt. Some patients do not like to take any medication, but if you wait until your pain is severe before taking, you will be very uncomfortable for several hours waiting for the narcotic to work. Always take with food.     Nausea / Vomiting: For this issue, we prescribe Phenergan, use this medication as directed.     Cold Therapy: You may have been sent home with a [facility] cold therapy unit and wrap for your knee. Fill with ice and water to the indicated fill line and use throughout the day for the first two days and then as  needed to help relieve pain and control swelling.      Regional Anesthesia Injections (Blocks): You may have been given a regional nerve block either before or after surgery. This may make your entire leg numb for 24-36 hours.                            * Proceed with caution when bearing weight on your leg.     2.   Diet: Eat a bland diet for the first day after surgery. Progress your diet as tolerated. Constipation may occur with Narcotic usage, contact our office if you are experiencing constipation.    3.   Activity: Limit your activity during the first 48 hours, keep your leg elevated with pillows under your heel. After the first 48 hours at home, increase your activity level based on your symptoms.    4.   Dressing Change: Remove the dressing on the 3rd day. It is normal for some blood to be seen on the dressings. It is also normal for you to see apparent bruising on the skin around your knee when you remove the dressing. If present, leave the steri-strip tape across the incisions. If you are concerned by the drainage or the appearance of your knee, please call one of the numbers listed below.    5.   Showering: You may shower on the 3rd day after surgery with waterproof bandages over small incisions. If you have an incision with Prineo (clear mesh), it does not need to be covered. Do not submerge in any water until after your postoperative appointment in clinic.    6.   Knee Brace: You may have been sent home in a hinged knee brace. Your brace is set at 0 to 120 degrees of motion. Wear the brace for 4 weeks, LOCKED in full extension when walking, you will need to wear this brace at all time unless instructed otherwise. You may unlock at rest or for exercises.    7.   Your procedure did not require a Continuous Passive Motion (CPM) device.    8.   Weight Bearing: You may have been sent home with crutches. If instructed (see below), use these crutches at all times unless at complete rest.      [] Non-weight  "bearing for      weeks (you may touch your toes to the floor)      [] Partial weight bearing for   weeks    [] 25% Body Weight   [] 50% Body Weight      [x] Full weight bearing            [x]  NOW    []  after  weeks     9.  Knee Exercises: Begin these exercises the first day after surgery in order to help you regain your motion and strength. You may do the following marked exercises:     [x] Quad Sets - Begin activating your quadriceps muscle by driving your          knee downward into full knee extension while seated on a table or bed   with a towel rolled and propped under your heel     [x] Straight Leg Raise (SLR) - While mary kay your quadriceps muscle, lift     your fully extended leg to the level of your non-operative knee (as shown)     [x] Heel Slides - With the knee straight, slide your heel slowly toward your   buttocks, hold at the endpoint for 10-15 seconds, then slowly straighten     [x] Ankle pumps - With your knee straight, move your ankle in a "pumping"    fashion to activate your calf and leg muscles      10.  Physical Therapy: Physical therapy is an essential component to your recovery from surgery. Your physical therapy will start in 3 days.    FIRST POSTOPERATIVE VISIT: As scheduled.       "

## 2018-03-20 NOTE — H&P (VIEW-ONLY)
Swapna Watkins  is here for a completion of her perioperative paperwork. she  Is scheduled to undergo 1. Left knee ACL reconstruction with hamstring autograft (double bundle)  2. Left knee Amniox arthrocentesis on 3/20/2018.  She is a healthy individual and does not need clearance for this procedure.     Risks, indications and benefits of the surgical procedure were discussed with the patient. All questions with regard to surgery, rehab, expected return to functional activities, activities of daily living and recreational endeavors were answered to her satisfaction.    Once no other questions were asked, a brief history and physical exam was then performed.      PHYSICAL EXAM:  GEN: A&Ox3, WD WN NAD  HEENT: WNL  CHEST: CTAB, no W/R/R  HEART: RRR, no M/R/G  ABD: Soft, NT ND, BS x4 QUADS  MS; See Epic  NEURO: CN II-XII intact       The surgical consent was then reviewed with the patient, who agreed with all the contents of the consent form and it was signed. she was then given the [facility] surgery packet to bring with her to [facility] for the anesthesia portion of her perioperative paperwork.     PHYSICAL THERAPY:  She was also instructed regarding physical therapy and will begin on  POD #1-3 at [facility] in [place].    POST OP CARE:instructions were reviewed including care of the wound and dressing after surgery and when she can shower.     PAIN MANAGEMENT: Swapna Watkins was also given her pain management regime, which includes the TENS unit which she received from Eduardo Philip along with the education required for its use. She was also instructed regarding the Polar ice unit that will be in place after surgery and her postoperative pain medications.     PAIN MEDICATION:  Percocet 10/325mg 1 po q 4-6 hours prn pain  Phenergan 25 mg one p.o. q.4-6 hours p.r.n. nausea and vomiting.  ASA 325mg once a day x 6 weeks  Celebrex 200mg BID    As there were no other questions to be asked, she was given my business card along  with Washington Deutsch MD business card if she has any questions or concerns prior to surgery or in the postop period.

## 2018-03-20 NOTE — BRIEF OP NOTE
[facility]    Brief Operative Note     SUMMARY     Surgery Date: 3/20/2018     Surgeon(s) and Role:     * Washington Deutsch MD - Primary    Assisting Surgeon: None    Pre-op Diagnosis:  Rupture of anterior cruciate ligament of left knee, initial encounter [S83.512A]    Post-op Diagnosis:  Post-Op Diagnosis Codes:     * Rupture of anterior cruciate ligament of left knee, initial encounter [S83.512A]    Procedure: Procedure(s) (LRB):  RECONSTRUCTION-LIGAMENT ANTERIOR CRUCIATE-ARTHROSCOPIC (Left)  INJECTION-STEROID; LEFT KNEE AMNIOX (Left)    Anesthesia: General    Description of the findings of the procedure: See Dictation    Findings/Key Components: ACL tear    Estimated Blood Loss: * No values recorded between 3/20/2018 12:13 PM and 3/20/2018  1:37 PM *         Specimens:   Specimen (12h ago through future)    None          Disposition: Patient tolerated the procedure well and was transferred to PACU in stable condition.      Discharge Note    SUMMARY     Admit Date: 3/20/2018    Discharge Date and Time:   03/20/2018 1:37 PM    Pre-op Diagnosis:  Rupture of anterior cruciate ligament of left knee, initial encounter [S83.512A]    Post-op Diagnosis:  Post-Op Diagnosis Codes:     * Rupture of anterior cruciate ligament of left knee, initial encounter [S83.512A]    Procedure: Procedure(s) (LRB):  RECONSTRUCTION-LIGAMENT ANTERIOR CRUCIATE-ARTHROSCOPIC (Left)  INJECTION-STEROID; LEFT KNEE AMNIOX (Left)    Hospital Course (synopsis of major diagnoses, care, treatment, and services provided during the course of the hospital stay): Patient underwent outpatient knee surgery and was transferred to PACU in stable condition.  In PACU, patient received appropriate post-operative care and discharged home with plans for physical therapy and follow-up with the operative surgeon.    Diet: Regular       Final Diagnosis: Post-Op Diagnosis Codes:     * Rupture of anterior cruciate ligament of left knee, initial encounter  "[J36.242A]    Disposition: Home or Self Care    Follow Up/Patient Instructions:     Medications:  Reconciled Home Medications:   Current Discharge Medication List      CONTINUE these medications which have NOT CHANGED    Details   multivitamin capsule Take 1 capsule by mouth once daily.      phentermine (ADIPEX-P) 37.5 mg tablet Take 37.5 mg by mouth before breakfast. Last dose 3/14      aspirin (ECOTRIN) 325 MG EC tablet Take 1 tablet (325 mg total) by mouth once daily.  Qty: 42 tablet, Refills: 0    Associated Diagnoses: Acute internal derangement of left knee; Rupture of anterior cruciate ligament of left knee, subsequent encounter      celecoxib (CELEBREX) 200 MG capsule Take 1 capsule (200 mg total) by mouth 2 (two) times daily.  Qty: 60 capsule, Refills: 2    Associated Diagnoses: Acute internal derangement of left knee; Rupture of anterior cruciate ligament of left knee, subsequent encounter      oxyCODONE-acetaminophen (PERCOCET)  mg per tablet Take 1 tablet by mouth every 6 (six) hours as needed for Pain.  Qty: 40 tablet, Refills: 0    Associated Diagnoses: Acute internal derangement of left knee; Rupture of anterior cruciate ligament of left knee, subsequent encounter      promethazine (PHENERGAN) 25 MG tablet Take 1 tablet (25 mg total) by mouth every 6 (six) hours as needed for Nausea.  Qty: 30 tablet, Refills: 0    Associated Diagnoses: Acute internal derangement of left knee; Rupture of anterior cruciate ligament of left knee, subsequent encounter             Discharge Procedure Orders  CRUTCHES FOR HOME USE   Order Comments: Provide if needed   Order Specific Question Answer Comments   Type: Axillary    Height: 5' 7" (1.702 m)    Weight: 65.8 kg (145 lb)    Does patient have medical equipment at home? none    Length of need (1-99 months): 24      Diet general     Call MD for:  temperature >100.4     Call MD for:  persistent nausea and vomiting     Call MD for:  severe uncontrolled pain     Call " MD for:  difficulty breathing, headache or visual disturbances     Call MD for:  redness, tenderness, or signs of infection (pain, swelling, redness, odor or green/yellow discharge around incision site)     Call MD for:  hives     Call MD for:  persistent dizziness or light-headedness     Weight bearing as tolerated     Remove dressing in 72 hours       Follow-up Information     Washington Deutsch MD.    Specialties:  Sports Medicine, Orthopedic Surgery  Why:  as scheduled pre op  Contact information:  [address] [address]  [phone]

## 2018-03-20 NOTE — ANESTHESIA POSTPROCEDURE EVALUATION
"Anesthesia Post Evaluation    Patient: Swapna Watkins    Procedure(s) Performed: Procedure(s) (LRB):  RECONSTRUCTION-LIGAMENT ANTERIOR CRUCIATE-ARTHROSCOPIC (Left)  INJECTION-STEROID; LEFT KNEE AMNIOX (Left)    Final Anesthesia Type: general  Patient location during evaluation: PACU  Patient participation: Yes- Able to Participate  Level of consciousness: awake and alert  Post-procedure vital signs: reviewed and stable  Pain management: adequate  Airway patency: patent  PONV status at discharge: No PONV  Anesthetic complications: no      Cardiovascular status: blood pressure returned to baseline  Respiratory status: unassisted  Hydration status: euvolemic  Follow-up not needed.        Visit Vitals  /68   Pulse 69   Temp 36.4 °C (97.5 °F) (Oral)   Resp 18   Ht 5' 7" (1.702 m)   Wt 65.8 kg (145 lb)   LMP 02/27/2018 (Exact Date)   SpO2 97%   Breastfeeding? No   BMI 22.71 kg/m²       Pain/Darryl Score: Pain Assessment Performed: Yes (3/20/2018  2:59 PM)  Presence of Pain: complains of pain/discomfort (3/20/2018  2:59 PM)  Pain Rating Prior to Med Admin: 5 (3/20/2018  3:09 PM)  Pain Rating Post Med Admin: 5 (3/20/2018  2:59 PM)  Darryl Score: 9 (3/20/2018  2:59 PM)      "
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
## 2018-03-20 NOTE — TRANSFER OF CARE
"Anesthesia Transfer of Care Note    Patient: Swapna Watkins    Procedure(s) Performed: Procedure(s) (LRB):  RECONSTRUCTION-LIGAMENT ANTERIOR CRUCIATE-ARTHROSCOPIC (Left)  INJECTION-STEROID; LEFT KNEE AMNIOX (Left)    Patient location: PACU    Anesthesia Type: general    Transport from OR: Transported from OR on room air with adequate spontaneous ventilation    Post pain: adequate analgesia    Post assessment: no apparent anesthetic complications    Post vital signs: stable    Level of consciousness: responds to stimulation    Nausea/Vomiting: no nausea/vomiting    Complications: none    Transfer of care protocol was followed      Last vitals:   Visit Vitals  /65 (BP Location: Left arm, Patient Position: Lying)   Pulse 64   Temp 36.8 °C (98.2 °F) (Oral)   Resp 16   Ht 5' 7" (1.702 m)   Wt 65.8 kg (145 lb)   LMP 02/27/2018 (Exact Date)   SpO2 100%   Breastfeeding? No   BMI 22.71 kg/m²     "

## 2018-03-20 NOTE — ANESTHESIA PROCEDURE NOTES
Peripheral    Patient location during procedure: holding area   Block not for primary anesthetic.  Reason for block: at surgeon's request and post-op pain management   Post-op Pain Location: knee pain  Start time: 3/20/2018 10:38 AM  Timeout: 3/20/2018 10:38 AM   End time: 3/20/2018 10:49 AM  Staffing  Anesthesiologist: ELVIE HERNANDEZ  Performed: anesthesiologist   Preanesthetic Checklist  Completed: patient identified, site marked, surgical consent, pre-op evaluation, timeout performed, IV checked, risks and benefits discussed and monitors and equipment checked  Peripheral Block  Patient position: supine  Prep: ChloraPrep  Patient monitoring: heart rate, cardiac monitor, continuous pulse ox and frequent blood pressure checks  Block type: adductor canal  Laterality: left  Injection technique: single shot  Needle  Needle gauge: 22 G  Needle length: 3.5 in  Needle localization: nerve stimulator and ultrasound guidance   -ultrasound image captured on disc.  Assessment  Injection assessment: local visualized surrounding nerve, negative parasthesia and negative aspiration  Paresthesia pain: none  Heart rate change: no  Slow fractionated injection: yes  Medications:  Bolus administered: 25 mL of 0.5 ropivacaine  Epinephrine added: none

## 2018-03-22 ENCOUNTER — PATIENT MESSAGE (OUTPATIENT)
Dept: SPORTS MEDICINE | Facility: CLINIC | Age: 33
End: 2018-03-22

## 2018-04-02 ENCOUNTER — HOSPITAL ENCOUNTER (OUTPATIENT)
Dept: RADIOLOGY | Facility: HOSPITAL | Age: 33
Discharge: HOME | End: 2018-04-02
Attending: PHYSICIAN ASSISTANT
Payer: COMMERCIAL

## 2018-04-02 ENCOUNTER — OFFICE VISIT (OUTPATIENT)
Dept: SPORTS MEDICINE | Facility: CLINIC | Age: 33
End: 2018-04-02
Payer: COMMERCIAL

## 2018-04-02 VITALS
WEIGHT: 145 LBS | HEART RATE: 84 BPM | SYSTOLIC BLOOD PRESSURE: 129 MMHG | HEIGHT: 67 IN | DIASTOLIC BLOOD PRESSURE: 74 MMHG | BODY MASS INDEX: 22.76 KG/M2

## 2018-04-02 DIAGNOSIS — S83.512D RUPTURE OF ANTERIOR CRUCIATE LIGAMENT OF LEFT KNEE, SUBSEQUENT ENCOUNTER: ICD-10-CM

## 2018-04-02 DIAGNOSIS — Z98.890 S/P ACL RECONSTRUCTION: Primary | ICD-10-CM

## 2018-04-02 DIAGNOSIS — M23.92 ACUTE INTERNAL DERANGEMENT OF LEFT KNEE: ICD-10-CM

## 2018-04-02 DIAGNOSIS — Z98.890 S/P ACL RECONSTRUCTION: ICD-10-CM

## 2018-04-02 PROCEDURE — 99024 POSTOP FOLLOW-UP VISIT: CPT | Mod: S$GLB,,, | Performed by: PHYSICIAN ASSISTANT

## 2018-04-02 PROCEDURE — 99999 PR PBB SHADOW E&M-EST. PATIENT-LVL III: CPT | Mod: PBBFAC,,, | Performed by: PHYSICIAN ASSISTANT

## 2018-04-02 PROCEDURE — 73560 X-RAY EXAM OF KNEE 1 OR 2: CPT | Mod: 26,LT,, | Performed by: RADIOLOGY

## 2018-04-02 PROCEDURE — 73560 X-RAY EXAM OF KNEE 1 OR 2: CPT | Mod: TC,FY,PO,LT

## 2018-04-02 RX ORDER — OXYCODONE AND ACETAMINOPHEN 10; 325 MG/1; MG/1
1 TABLET ORAL EVERY 8 HOURS PRN
Qty: 40 TABLET | Refills: 0 | Status: SHIPPED | OUTPATIENT
Start: 2018-04-02 | End: 2018-04-02 | Stop reason: SDUPTHER

## 2018-04-02 RX ORDER — SULFAMETHOXAZOLE AND TRIMETHOPRIM 800; 160 MG/1; MG/1
1 TABLET ORAL 2 TIMES DAILY
Qty: 20 TABLET | Refills: 0 | Status: SHIPPED | OUTPATIENT
Start: 2018-04-02 | End: 2018-04-12

## 2018-04-02 RX ORDER — OXYCODONE AND ACETAMINOPHEN 10; 325 MG/1; MG/1
1 TABLET ORAL EVERY 8 HOURS PRN
Qty: 40 TABLET | Refills: 0 | Status: SHIPPED | OUTPATIENT
Start: 2018-04-02 | End: 2018-04-02 | Stop reason: CLARIF

## 2018-04-02 RX ORDER — OXYCODONE AND ACETAMINOPHEN 10; 325 MG/1; MG/1
1 TABLET ORAL EVERY 8 HOURS PRN
Qty: 40 TABLET | Refills: 0 | Status: SHIPPED | OUTPATIENT
Start: 2018-04-02 | End: 2019-07-17

## 2018-04-02 NOTE — PROGRESS NOTES
Subjective:          Chief Complaint: Swapna Watkins is a 33 y.o. female who had concerns including Post-op Evaluation of the Left Knee.    HPI   Patient presents to clinic for post op evaluation of left knee. Pain today is 6/10. She has been taking Percocet approximately 3 times a day and needs a refill. Denies nausea, vomiting, fever, chills. She is currently WBAT with one crutch and T-scope brace in place set to 120 degrees. She has been going to PT at [facility]  2x a week.  She states that her Dermabond strip came off and she has been accidentally scratching her incision.    DATE OF PROCEDURE: 3/20/2018  ATTENDING SURGEON: Surgeon(s) and Role:     * Washington Deutsch MD - Primary     Assistants:  MD Jeanna Champion SMA        PREOPERATIVE DIAGNOSIS:  Left  Anterior Cruciate Ligament Tear S83.510     POSTOPERATIVE DIAGNOSIS:   Left  Anterior Cruciate Ligament Tear S83.510     PROCEDURES(S) PERFORMED:   1. Left  Arthroscopy, anterior cruciate ligament reconstruction 00879  2.  Left  Arthroscopy, with lysis of adhesions 58653  3.  Left  Amniox Arthrocentesis, 03038      Review of Systems   Constitution: Negative. Negative for chills, fever, weight gain and weight loss.   HENT: Negative for congestion and sore throat.    Eyes: Negative for blurred vision and double vision.   Cardiovascular: Negative for chest pain, leg swelling and palpitations.   Respiratory: Negative for cough and shortness of breath.    Hematologic/Lymphatic: Does not bruise/bleed easily.   Skin: Negative for itching, poor wound healing and rash.   Musculoskeletal: Positive for joint pain, joint swelling and stiffness. Negative for back pain, muscle weakness and myalgias.   Gastrointestinal: Negative for abdominal pain, constipation, diarrhea, nausea and vomiting.   Genitourinary: Negative.  Negative for frequency and hematuria.   Neurological: Negative for dizziness, headaches, numbness, paresthesias and sensory change.    Psychiatric/Behavioral: Negative for altered mental status and depression. The patient is not nervous/anxious.    Allergic/Immunologic: Negative for hives.       Pain Related Questions  Over the past 3 days, what was your average pain during activity? (I.e. running, jogging, walking, climbing stairs, getting dressed, ect.): 9  Over the past 3 days, what was your highest pain level?: 9  Over the past 3 days, what was your lowest pain level? : 2    Other  How many nights a week are you awakened by your affected body part?: 0  Was the patient's HEIGHT measured or patient reported?: Patient Reported  Was the patient's WEIGHT measured or patient reported?: Measured      Objective:        General: Swapna is well-developed, well-nourished, appears stated age, in no acute distress, alert and oriented to time, place and person.     General    Vitals reviewed.  Constitutional: She is oriented to person, place, and time. She appears well-developed and well-nourished. No distress.   Neck: Normal range of motion.   Cardiovascular: Normal rate and regular rhythm.    Pulmonary/Chest: Effort normal. No respiratory distress.   Neurological: She is alert and oriented to person, place, and time.   Psychiatric: She has a normal mood and affect. Her behavior is normal. Thought content normal.     General Musculoskeletal Exam   Gait: abnormal         Left Knee Exam     Inspection   Erythema: absent  Scars: present  Swelling: present  Effusion: absent  Deformity: deformity  Bruising: absent    Range of Motion   Extension:  0 normal   Flexion:  90 abnormal     Other   Sensation: normal    Comments:  2 portal sutures removed. No signs of  Infection or necrosis. No purulent drainage. NVI. Dermabond prineo is no longer over incision.     Muscle Strength   Left Lower Extremity   Hip Abduction: 5/5   Quadriceps:  4/5   Hamstrin/5       RADIOGRAPHS:  Left knee:    FINDINGS:  There is ACL repair.  No fracture dislocation bone destruction  seen.          Assessment:       Encounter Diagnoses   Name Primary?    S/P ACL reconstruction Yes    Acute internal derangement of left knee     Rupture of anterior cruciate ligament of left knee, subsequent encounter           Plan:       1. Provided patient with operative note.  2. Removed 2  portal sutures. No signs of infection or necrosis. No purulent drainage. Three steri-strips placed over incision.   3. Rx Bactrim DS, 1 PO BID x 10 days, dispense #20 tablets, 0 refills  4. Continue  mg once a day.  5. Continue PT per protocol. Discontinue crutches as tolerated.  6. RTC in 1 week with Dorie Saab PA-C for wound check.  7. Rx for Percocet 10 mg q 8, #40 sent to pharmacy. Start weaning off.                      Patient questionnaires may have been collected.

## 2018-04-09 ENCOUNTER — OFFICE VISIT (OUTPATIENT)
Dept: SPORTS MEDICINE | Facility: CLINIC | Age: 33
End: 2018-04-09
Payer: COMMERCIAL

## 2018-04-09 VITALS
WEIGHT: 147 LBS | DIASTOLIC BLOOD PRESSURE: 74 MMHG | HEIGHT: 67 IN | SYSTOLIC BLOOD PRESSURE: 112 MMHG | BODY MASS INDEX: 23.07 KG/M2 | HEART RATE: 74 BPM

## 2018-04-09 DIAGNOSIS — M23.92 ACUTE INTERNAL DERANGEMENT OF LEFT KNEE: ICD-10-CM

## 2018-04-09 DIAGNOSIS — Z51.89 VISIT FOR WOUND CHECK: ICD-10-CM

## 2018-04-09 DIAGNOSIS — Z98.890 S/P ACL RECONSTRUCTION: Primary | ICD-10-CM

## 2018-04-09 PROCEDURE — 99999 PR PBB SHADOW E&M-EST. PATIENT-LVL III: CPT | Mod: PBBFAC,,, | Performed by: PHYSICIAN ASSISTANT

## 2018-04-09 PROCEDURE — 99024 POSTOP FOLLOW-UP VISIT: CPT | Mod: S$GLB,,, | Performed by: PHYSICIAN ASSISTANT

## 2018-04-09 NOTE — PROGRESS NOTES
Subjective:          Chief Complaint: Swapna Watkins is a 33 y.o. female who had concerns including Post-op Evaluation of the Left Knee.    HPI   Patient presents to clinic for left knee post op wound check. Pain today is 2/10. She is only taking percocet 10mg as needed for sleep a few times a week. Denies nausea, vomiting, fever, chills. She is currently WBAT without crutches and T-scope brace in place set to 120 degrees. She has been going to PT at [facility]  2x a week. She is currently taking Bactrim BID.    DATE OF PROCEDURE: 3/20/2018  ATTENDING SURGEON: Surgeon(s) and Role:     * Washington Deutsch MD - Primary     Assistants:  MD Jeanna Champion SMA        PREOPERATIVE DIAGNOSIS:  Left  Anterior Cruciate Ligament Tear S83.510     POSTOPERATIVE DIAGNOSIS:   Left  Anterior Cruciate Ligament Tear S83.510     PROCEDURES(S) PERFORMED:   1. Left  Arthroscopy, anterior cruciate ligament reconstruction 77675  2.  Left  Arthroscopy, with lysis of adhesions 21559  3.  Left  Amniox Arthrocentesis, 91006      Review of Systems   Constitution: Negative. Negative for chills, fever, weight gain and weight loss.   HENT: Negative for congestion and sore throat.    Eyes: Negative for blurred vision and double vision.   Cardiovascular: Negative for chest pain, leg swelling and palpitations.   Respiratory: Negative for cough and shortness of breath.    Hematologic/Lymphatic: Does not bruise/bleed easily.   Skin: Negative for itching, poor wound healing and rash.   Musculoskeletal: Positive for joint pain, joint swelling and stiffness. Negative for back pain, muscle weakness and myalgias.   Gastrointestinal: Negative for abdominal pain, constipation, diarrhea, nausea and vomiting.   Genitourinary: Negative.  Negative for frequency and hematuria.   Neurological: Negative for dizziness, headaches, numbness, paresthesias and sensory change.   Psychiatric/Behavioral: Negative for altered mental status and  depression. The patient is not nervous/anxious.    Allergic/Immunologic: Negative for hives.       Pain Related Questions  Over the past 3 days, what was your average pain during activity? (I.e. running, jogging, walking, climbing stairs, getting dressed, ect.): 2  Over the past 3 days, what was your highest pain level?: 4  Over the past 3 days, what was your lowest pain level? : 0    Other  How many nights a week are you awakened by your affected body part?: 7  Was the patient's HEIGHT measured or patient reported?: Patient Reported  Was the patient's WEIGHT measured or patient reported?: Measured      Objective:        General: Swapna is well-developed, well-nourished, appears stated age, in no acute distress, alert and oriented to time, place and person.     General    Vitals reviewed.  Constitutional: She is oriented to person, place, and time. She appears well-developed and well-nourished. No distress.   Neck: Normal range of motion.   Cardiovascular: Normal rate and regular rhythm.    Pulmonary/Chest: Effort normal. No respiratory distress.   Neurological: She is alert and oriented to person, place, and time.   Psychiatric: She has a normal mood and affect. Her behavior is normal. Thought content normal.     General Musculoskeletal Exam   Gait: abnormal         Left Knee Exam     Inspection   Erythema: absent  Scars: present  Swelling: present  Effusion: absent  Deformity: deformity  Bruising: absent    Tenderness   Left knee tenderness location: over incision.    Range of Motion   Extension:  0 normal   Flexion: abnormal Left knee flexion: 115.     Other   Sensation: normal    Comments:   No signs of  Infection or necrosis. No purulent drainage. NVI. Incision continuously healing well.     Muscle Strength   Left Lower Extremity   Hip Abduction: 5/5   Quadriceps:  4/5   Hamstrin/5       RADIOGRAPHS:  Left knee:    FINDINGS:  There is ACL repair.  No fracture dislocation bone destruction seen.           Assessment:       Encounter Diagnoses   Name Primary?    S/P ACL reconstruction Yes    Acute internal derangement of left knee     Visit for wound check           Plan:     1. Incision evaluated. No signs of infection or necrosis. No purulent drainage. Covered with mepore bandage.  2. Continue to keep incision covered when showering.  3. Continue  mg once a day.  4. Continue PT per protocol.  5. RTC in 3 week with Dr. Washington Deutsch for 6 week post op. Send picture of incision in 5 days through [facility].  6. Wean out of T-scope brace as quad function increases.  7. Finish bactrim Rx                      Patient questionnaires may have been collected.

## 2018-04-30 ENCOUNTER — OFFICE VISIT (OUTPATIENT)
Dept: SPORTS MEDICINE | Facility: CLINIC | Age: 33
End: 2018-04-30
Payer: COMMERCIAL

## 2018-04-30 VITALS
WEIGHT: 147 LBS | SYSTOLIC BLOOD PRESSURE: 118 MMHG | BODY MASS INDEX: 23.07 KG/M2 | HEIGHT: 67 IN | HEART RATE: 75 BPM | DIASTOLIC BLOOD PRESSURE: 81 MMHG

## 2018-04-30 DIAGNOSIS — M25.562 CHRONIC PAIN OF LEFT KNEE: ICD-10-CM

## 2018-04-30 DIAGNOSIS — G89.29 CHRONIC PAIN OF LEFT KNEE: ICD-10-CM

## 2018-04-30 DIAGNOSIS — M23.92 INTERNAL DERANGEMENT OF KNEE JOINT, LEFT: ICD-10-CM

## 2018-04-30 DIAGNOSIS — S83.512S RUPTURE OF ANTERIOR CRUCIATE LIGAMENT OF LEFT KNEE, SEQUELA: Chronic | ICD-10-CM

## 2018-04-30 DIAGNOSIS — Z98.890 S/P ACL RECONSTRUCTION: ICD-10-CM

## 2018-04-30 DIAGNOSIS — M85.60 BONE CYST: ICD-10-CM

## 2018-04-30 DIAGNOSIS — M23.92 ACUTE INTERNAL DERANGEMENT OF LEFT KNEE: Primary | ICD-10-CM

## 2018-04-30 PROCEDURE — 99024 POSTOP FOLLOW-UP VISIT: CPT | Mod: S$GLB,,, | Performed by: ORTHOPAEDIC SURGERY

## 2018-04-30 PROCEDURE — 99999 PR PBB SHADOW E&M-EST. PATIENT-LVL III: CPT | Mod: PBBFAC,,, | Performed by: ORTHOPAEDIC SURGERY

## 2018-04-30 NOTE — PROGRESS NOTES
Subjective:          Chief Complaint: Swapna Watkins is a 33 y.o. female who had concerns including Post-op Evaluation of the Left Knee.    Patient is here for a follow up for her left knee. She is doing PT at [email or web address]. She is doing well but does complain of a deep soreness. Her incision is healing. She states that her back hurts from the way she has been walking. She stopped wearing the brace last week. She isn't taking any medications regularly but takes Celebrex when she remembers.     DATE OF PROCEDURE: 3/20/2018  ATTENDING SURGEON: Surgeon(s) and Role:     * Washington Deutsch MD - Primary     Assistants:  MD Jeanna Champion SMA        PREOPERATIVE DIAGNOSIS:  Left  Anterior Cruciate Ligament Tear S83.510     POSTOPERATIVE DIAGNOSIS:   Left  Anterior Cruciate Ligament Tear S83.510     PROCEDURES(S) PERFORMED:   1. Left  Arthroscopy, anterior cruciate ligament reconstruction 99880  2.  Left  Arthroscopy, with lysis of adhesions 78302  3.  Left  Amniox Arthrocentesis, 76551                 Review of Systems   Constitution: Negative. Negative for chills, fever, weight gain and weight loss.   HENT: Negative for congestion and sore throat.    Eyes: Negative for blurred vision and double vision.   Cardiovascular: Negative for chest pain, leg swelling and palpitations.   Respiratory: Negative for cough and shortness of breath.    Hematologic/Lymphatic: Does not bruise/bleed easily.   Skin: Negative for itching, poor wound healing and rash.   Musculoskeletal: Positive for joint pain, joint swelling and stiffness. Negative for back pain, muscle weakness and myalgias.   Gastrointestinal: Negative for abdominal pain, constipation, diarrhea, nausea and vomiting.   Genitourinary: Negative.  Negative for frequency and hematuria.   Neurological: Negative for dizziness, headaches, numbness, paresthesias and sensory change.   Psychiatric/Behavioral: Negative for altered mental status and depression. The  patient is not nervous/anxious.    Allergic/Immunologic: Negative for hives.       Pain Related Questions  Over the past 3 days, what was your average pain during activity? (I.e. running, jogging, walking, climbing stairs, getting dressed, ect.): 3  Over the past 3 days, what was your highest pain level?: 3  Over the past 3 days, what was your lowest pain level? : 0    Other  How many nights a week are you awakened by your affected body part?: 7  Was the patient's HEIGHT measured or patient reported?: Patient Reported  Was the patient's WEIGHT measured or patient reported?: Measured      Objective:        General: Swapna is well-developed, well-nourished, appears stated age, in no acute distress, alert and oriented to time, place and person.     General    Vitals reviewed.  Constitutional: She is oriented to person, place, and time. She appears well-developed and well-nourished. No distress.   Neck: Normal range of motion.   Cardiovascular: Normal rate and regular rhythm.    Pulmonary/Chest: Effort normal. No respiratory distress.   Neurological: She is alert and oriented to person, place, and time.   Psychiatric: She has a normal mood and affect. Her behavior is normal. Thought content normal.     General Musculoskeletal Exam   Gait: normal         Left Knee Exam     Inspection   Erythema: absent  Scars: present  Swelling: absent  Effusion: absent  Deformity: deformity  Bruising: absent    Tenderness   The patient tender to palpation of the medial joint line and lateral joint line.    Range of Motion   Extension:  0 normal   Flexion:  130 abnormal     Other   Sensation: normal    Muscle Strength   Left Lower Extremity   Hip Abduction: 5/5   Quadriceps:  4/5   Hamstrin/5               Assessment:       Encounter Diagnoses   Name Primary?    Acute internal derangement of left knee Yes    Bone cyst     Internal derangement of knee joint, left     Rupture of anterior cruciate ligament of left knee, sequela      Chronic pain of left knee     S/P ACL reconstruction           Plan:     1. IKDC, SF-12 and KOOS was not filled out today in clinic.     RTC in 6 weeks with Dr. Washington Deutsch Patient will fill out IKDC, SF-12 and KOOS and bilateral knee series on return.    2. Continue PT per protocol - new referral placed today                      Patient questionnaires may have been collected.

## 2019-07-17 PROBLEM — M23.92 ACUTE INTERNAL DERANGEMENT OF LEFT KNEE: Status: RESOLVED | Noted: 2017-11-21 | Resolved: 2019-07-17

## 2019-07-17 PROBLEM — S83.512A LEFT ANTERIOR CRUCIATE LIGAMENT TEAR: Chronic | Status: RESOLVED | Noted: 2017-11-06 | Resolved: 2019-07-17

## 2019-07-17 PROBLEM — M85.60 BONE CYST: Status: RESOLVED | Noted: 2017-11-13 | Resolved: 2019-07-17

## 2019-07-17 PROBLEM — M25.562 LEFT KNEE PAIN: Status: RESOLVED | Noted: 2017-11-06 | Resolved: 2019-07-17

## 2019-07-17 PROBLEM — M23.92 INTERNAL DERANGEMENT OF KNEE JOINT, LEFT: Status: RESOLVED | Noted: 2017-11-06 | Resolved: 2019-07-17

## 2020-04-07 ENCOUNTER — NURSE TRIAGE (OUTPATIENT)
Dept: ADMINISTRATIVE | Facility: CLINIC | Age: 35
End: 2020-04-07

## 2020-04-07 ENCOUNTER — OFFICE VISIT (OUTPATIENT)
Dept: URGENT CARE | Facility: CLINIC | Age: 35
End: 2020-04-07
Payer: COMMERCIAL

## 2020-04-07 VITALS
OXYGEN SATURATION: 98 % | WEIGHT: 155 LBS | RESPIRATION RATE: 18 BRPM | BODY MASS INDEX: 24.33 KG/M2 | HEART RATE: 79 BPM | TEMPERATURE: 99 F | HEIGHT: 67 IN

## 2020-04-07 DIAGNOSIS — R05.9 COUGH: Primary | ICD-10-CM

## 2020-04-07 PROCEDURE — 99214 PR OFFICE/OUTPT VISIT, EST, LEVL IV, 30-39 MIN: ICD-10-PCS | Mod: S$GLB,,, | Performed by: PHYSICIAN ASSISTANT

## 2020-04-07 PROCEDURE — 99214 OFFICE O/P EST MOD 30 MIN: CPT | Mod: S$GLB,,, | Performed by: PHYSICIAN ASSISTANT

## 2020-04-07 RX ORDER — AZITHROMYCIN 250 MG/1
TABLET, FILM COATED ORAL
Qty: 6 TABLET | Refills: 0 | Status: SHIPPED | OUTPATIENT
Start: 2020-04-07 | End: 2021-02-09

## 2020-04-07 RX ORDER — ALBUTEROL SULFATE 90 UG/1
2 AEROSOL, METERED RESPIRATORY (INHALATION) EVERY 6 HOURS PRN
Qty: 18 G | Refills: 0 | Status: SHIPPED | OUTPATIENT
Start: 2020-04-07 | End: 2021-02-09

## 2020-04-07 RX ORDER — BENZONATATE 100 MG/1
100 CAPSULE ORAL EVERY 6 HOURS PRN
Qty: 60 CAPSULE | Refills: 1 | Status: SHIPPED | OUTPATIENT
Start: 2020-04-07 | End: 2021-02-09

## 2020-04-07 NOTE — PATIENT INSTRUCTIONS
Instructions for Patients Awaiting COVID-19 Test Results    You will either be called with your test result or it will be released to the patient portal.  If you have any questions about your test, please visit [email or web address] or call our COVID-19 information line at [phone].    Prevention steps for patients with confirmed or suspected COVID-19       Stay home and stay away from family members and friends. The CDC says, you can leave home after these three things have happened: 1) You have had no fever for at least 72 hours (that is three full days of no fever without the use of medicine that reduces fevers) 2) AND other symptoms have improved (for example, when your cough or shortness of breath have improved) 3) AND at least 7 days have passed since your symptoms first appeared.   Separate yourself from other people and animals in your home.   Call ahead before visiting your doctor.   Wear a facemask.   Cover your coughs and sneezes.   Wash your hands often with soap and water; hand  can be used, too.   Avoid sharing personal household items.   Wipe down surfaces used daily.   Monitor your symptoms. Seek prompt medical attention if your illness is worsening (e.g., difficulty breathing).    Before seeking care, call your healthcare provider.   If you have a medical emergency and need to call 911, notify the dispatch personnel that you have, or are being evaluated for COVID-19. If possible, put on a facemask before emergency medical services arrive.        Recommended precautions for household members, intimate partners, and caregivers in a home setting of a patient with symptomatic laboratory-confirmed COVID-19 or a patient under investigation.  Household members, intimate partners, and caregivers in the home setting awaiting tests results have close contact with a person with symptomatic, laboratory-confirmed COVID-19 or a person under investigation. Close contacts should  monitor their health; they should call their provider right away if they develop symptoms suggestive of COVID-19 (e.g., fever, cough, shortness of breath).    Close contacts should also follow these recommendations:   Make sure that you understand and can help the patient follow their provider's instructions for medication(s) and care. You should help the patient with basic needs in the home and provide support for getting groceries, prescriptions, and other personal needs.   Monitor the patient's symptoms. If the patient is getting sicker, call his or her healthcare provider and tell them that the patient has laboratory-confirmed COVID-19. If the patient has a medical emergency and you need to call 911, notify the dispatch personnel that the patient has, or is being evaluated for COVID-19.   Household members should stay in another room or be  from the patient. Household members should use a separate bedroom and bathroom, if available.   Prohibit visitors.   Household members should care for any pets in the home.   Make sure that shared spaces in the home have good air flow, such as by an air conditioner or an opened window, weather permitting.   Perform hand hygiene frequently. Wash your hands often with soap and water for at least 20 seconds or use an alcohol-based hand  (that contains > 60% alcohol) covering all surfaces of your hands and rubbing them together until they feel dry. Soap and water should be used preferentially.   Avoid touching your eyes, nose, and mouth.   The patient should wear a facemask. If the patient is not able to wear a facemask (for example, because it causes trouble breathing), caregivers should wear a mask when they are in the same room as the patient.   Wear a disposable facemask and gloves when you touch or have contact with the patient's blood, stool, or body fluids, such as saliva, sputum, nasal mucus, vomit, urine.  o Throw out disposable facemasks and  gloves after using them. Do not reuse.  o When removing personal protective equipment, first remove and dispose of gloves. Then, immediately clean your hands with soap and water or alcohol-based hand . Next, remove and dispose of facemask, and immediately clean your hands again with soap and water or alcohol-based hand .   You should not share dishes, drinking glasses, cups, eating utensils, towels, bedding, or other items with the patient. After the patient uses these items, you should wash them thoroughly (see below Wash laundry thoroughly).   Clean all high-touch surfaces, such as counters, tabletops, doorknobs, bathroom fixtures, toilets, phones, keyboards, tablets, and bedside tables, every day. Also, clean any surfaces that may have blood, stool, or body fluids on them.   Use a household cleaning spray or wipe, according to the label instructions. Labels contain instructions for safe and effective use of the cleaning product including precautions you should take when applying the product, such as wearing gloves and making sure you have good ventilation during use of the product.   Wash laundry thoroughly.  o Immediately remove and wash clothes or bedding that have blood, stool, or body fluids on them.  o Wear disposable gloves while handling soiled items and keep soiled items away from your body. Clean your hands (with soap and water or an alcohol-based hand ) immediately after removing your gloves.  o Read and follow directions on labels of laundry or clothing items and detergent. In general, using a normal laundry detergent according to washing machine instructions and dry thoroughly using the warmest temperatures recommended on the clothing label.   Place all used disposable gloves, facemasks, and other contaminated items in a lined container before disposing of them with other household waste. Clean your hands (with soap and water or an alcohol-based hand )  immediately after handling these items. Soap and water should be used preferentially if hands are visibly dirty.   Discuss any additional questions with your state or local health department or healthcare provider. Check available hours when contacting your local health department.    For more information see CDC link below.      https://www.cdc.gov/coronavirus/2019-ncov/hcp/guidance-prevent-spread.html#precautions        Sources:  [facility], [place] [facility] and [facility]

## 2020-04-07 NOTE — TELEPHONE ENCOUNTER
- interment chest pain  - SOB  - intermittent cough  - afebrile  - sore throat  - symptoms x 3 days, chest paint worsening    Referred to  testing center due to chest pain and shortness of breath.    Reason for Disposition   MILD difficulty breathing (e.g., minimal/no SOB at rest, SOB with walking, pulse <100)    Additional Information   Negative: SEVERE difficulty breathing (e.g., struggling for each breath, speaks in single words)   Negative: Difficult to awaken or acting confused (e.g., disoriented, slurred speech)   Negative: Bluish (or gray) lips or face now   Negative: Shock suspected (e.g., cold/pale/clammy skin, too weak to stand, low BP, rapid pulse)   Negative: Sounds like a life-threatening emergency to the triager   Negative: [1] COVID-19 suspected (e.g., cough, fever, shortness of breath) AND [2] public health department recommends testing   Negative: [1] COVID-19 exposure AND [2] no symptoms   Negative: COVID-19 and Breastfeeding, questions about   Negative: SEVERE or constant chest pain (Exception: mild central chest pain, present only when coughing)   Negative: MODERATE difficulty breathing (e.g., speaks in phrases, SOB even at rest, pulse 100-120)   Negative: Patient sounds very sick or weak to the triager    Protocols used: CORONAVIRUS (COVID-19) DIAGNOSED OR [id number]

## 2020-04-07 NOTE — PROGRESS NOTES
"Subjective:       Patient ID: Swapna Watkins is a 35 y.o. female.    Vitals:  height is 5' 7" (1.702 m) and weight is 70.3 kg (155 lb). Her temperature is 98.7 °F (37.1 °C). Her pulse is 79. Her respiration is 18 and oxygen saturation is 98%.     Chief Complaint: Chest Pain    Chest Pain    This is a new problem. The current episode started in the past 7 days. The onset quality is gradual. The problem occurs daily. The problem has been rapidly worsening. The quality of the pain is described as heavy (pt states it woke up her from sleep, and it is a sore pain on the left side of her chest). Associated symptoms include a cough and weakness. Pertinent negatives include no abdominal pain, back pain, fever, nausea, palpitations, shortness of breath, syncope or vomiting. Associated with: laying down. She has tried NSAIDs and acetaminophen for the symptoms. The treatment provided no relief. Risk factors include smoking/tobacco exposure.       Constitution: Positive for generalized weakness. Negative for chills, fatigue, fever and international travel in last 60 days.   HENT: Negative for trouble swallowing.    Neck: Negative for neck pain.   Cardiovascular: Positive for chest pain (discomfort). Negative for chest trauma, leg swelling, palpitations and passing out.   Respiratory: Positive for cough. Negative for sleep apnea and shortness of breath.    Gastrointestinal: Negative for abdominal pain, nausea, vomiting, diarrhea and heartburn.   Genitourinary: Negative for history of kidney stones.   Musculoskeletal: Negative for back pain.   Skin: Negative for rash.   Neurological: Negative for light-headedness and passing out.   Hematologic/Lymphatic: Negative for history of blood clots.   Psychiatric/Behavioral: Negative for nervous/anxious. The patient is not nervous/anxious.        Objective:      Physical Exam   Constitutional: She appears well-developed and well-nourished. No distress.   HENT:   Head: Normocephalic and " atraumatic.   Right Ear: External ear normal.   Left Ear: External ear normal.   Eyes: Conjunctivae and EOM are normal.   Cardiovascular: Normal rate, regular rhythm and normal heart sounds.   No murmur heard.  Pulmonary/Chest: Effort normal and breath sounds normal. No respiratory distress. She has no wheezes.   Neurological: She is alert.   Skin: Skin is not diaphoretic.   Psychiatric: She has a normal mood and affect. Her behavior is normal. Judgment and thought content normal.   Nursing note and vitals reviewed.      Limited exam using [facility] due to COVID emergency  Assessment:       1. Cough        Plan:         Cough    Other orders  -     benzonatate (TESSALON PERLES) 100 MG capsule; Take 1 capsule (100 mg total) by mouth every 6 (six) hours as needed.  Dispense: 60 capsule; Refill: 1  -     albuterol (VENTOLIN HFA) 90 mcg/actuation inhaler; Inhale 2 puffs into the lungs every 6 (six) hours as needed for Wheezing. Rescue  Dispense: 18 g; Refill: 0  -     azithromycin (Z-ALMA DELIA) 250 MG tablet; Take 2 tablets by mouth on day 1; Take 1 tablet by mouth on days 2-5  Dispense: 6 tablet; Refill: 0    Patient non=-tachy. No pleuritic type chest pain. Admits to anxiety over COVID. Discussed treatment with above listed medications and precautions. Patient v/u and agrees with plan.    Patient Instructions   Instructions for Patients Awaiting COVID-19 Test Results    You will either be called with your test result or it will be released to the patient portal.  If you have any questions about your test, please visit [email or web address] or call our COVID-19 information line at [phone].    Prevention steps for patients with confirmed or suspected COVID-19       Stay home and stay away from family members and friends. The CDC says, you can leave home after these three things have happened: 1) You have had no fever for at least 72 hours (that is three full days of no fever without the use of medicine that  reduces fevers) 2) AND other symptoms have improved (for example, when your cough or shortness of breath have improved) 3) AND at least 7 days have passed since your symptoms first appeared.   Separate yourself from other people and animals in your home.   Call ahead before visiting your doctor.   Wear a facemask.   Cover your coughs and sneezes.   Wash your hands often with soap and water; hand  can be used, too.   Avoid sharing personal household items.   Wipe down surfaces used daily.   Monitor your symptoms. Seek prompt medical attention if your illness is worsening (e.g., difficulty breathing).    Before seeking care, call your healthcare provider.   If you have a medical emergency and need to call 911, notify the dispatch personnel that you have, or are being evaluated for COVID-19. If possible, put on a facemask before emergency medical services arrive.        Recommended precautions for household members, intimate partners, and caregivers in a home setting of a patient with symptomatic laboratory-confirmed COVID-19 or a patient under investigation.  Household members, intimate partners, and caregivers in the home setting awaiting tests results have close contact with a person with symptomatic, laboratory-confirmed COVID-19 or a person under investigation. Close contacts should monitor their health; they should call their provider right away if they develop symptoms suggestive of COVID-19 (e.g., fever, cough, shortness of breath).    Close contacts should also follow these recommendations:   Make sure that you understand and can help the patient follow their provider's instructions for medication(s) and care. You should help the patient with basic needs in the home and provide support for getting groceries, prescriptions, and other personal needs.   Monitor the patient's symptoms. If the patient is getting sicker, call his or her healthcare provider and tell them that the patient has  laboratory-confirmed COVID-19. If the patient has a medical emergency and you need to call 911, notify the dispatch personnel that the patient has, or is being evaluated for COVID-19.   Household members should stay in another room or be  from the patient. Household members should use a separate bedroom and bathroom, if available.   Prohibit visitors.   Household members should care for any pets in the home.   Make sure that shared spaces in the home have good air flow, such as by an air conditioner or an opened window, weather permitting.   Perform hand hygiene frequently. Wash your hands often with soap and water for at least 20 seconds or use an alcohol-based hand  (that contains > 60% alcohol) covering all surfaces of your hands and rubbing them together until they feel dry. Soap and water should be used preferentially.   Avoid touching your eyes, nose, and mouth.   The patient should wear a facemask. If the patient is not able to wear a facemask (for example, because it causes trouble breathing), caregivers should wear a mask when they are in the same room as the patient.   Wear a disposable facemask and gloves when you touch or have contact with the patient's blood, stool, or body fluids, such as saliva, sputum, nasal mucus, vomit, urine.  o Throw out disposable facemasks and gloves after using them. Do not reuse.  o When removing personal protective equipment, first remove and dispose of gloves. Then, immediately clean your hands with soap and water or alcohol-based hand . Next, remove and dispose of facemask, and immediately clean your hands again with soap and water or alcohol-based hand .   You should not share dishes, drinking glasses, cups, eating utensils, towels, bedding, or other items with the patient. After the patient uses these items, you should wash them thoroughly (see below Wash laundry thoroughly).   Clean all high-touch surfaces, such as  counters, tabletops, doorknobs, bathroom fixtures, toilets, phones, keyboards, tablets, and bedside tables, every day. Also, clean any surfaces that may have blood, stool, or body fluids on them.   Use a household cleaning spray or wipe, according to the label instructions. Labels contain instructions for safe and effective use of the cleaning product including precautions you should take when applying the product, such as wearing gloves and making sure you have good ventilation during use of the product.   Wash laundry thoroughly.  o Immediately remove and wash clothes or bedding that have blood, stool, or body fluids on them.  o Wear disposable gloves while handling soiled items and keep soiled items away from your body. Clean your hands (with soap and water or an alcohol-based hand ) immediately after removing your gloves.  o Read and follow directions on labels of laundry or clothing items and detergent. In general, using a normal laundry detergent according to washing machine instructions and dry thoroughly using the warmest temperatures recommended on the clothing label.   Place all used disposable gloves, facemasks, and other contaminated items in a lined container before disposing of them with other household waste. Clean your hands (with soap and water or an alcohol-based hand ) immediately after handling these items. Soap and water should be used preferentially if hands are visibly dirty.   Discuss any additional questions with your state or local health department or healthcare provider. Check available hours when contacting your local health department.    For more information see CDC link below.      https://www.cdc.gov/coronavirus/2019-ncov/hcp/guidance-prevent-spread.html#precautions        Sources:  [facility], [place] Department of [facility] and [facility]

## 2023-02-15 ENCOUNTER — OFFICE VISIT (OUTPATIENT)
Dept: FAMILY MEDICINE | Facility: CLINIC | Age: 38
End: 2023-02-15

## 2023-02-15 ENCOUNTER — PATIENT MESSAGE (OUTPATIENT)
Dept: FAMILY MEDICINE | Facility: CLINIC | Age: 38
End: 2023-02-15

## 2023-02-15 DIAGNOSIS — J20.8 ACUTE BRONCHITIS, BACTERIAL: Primary | ICD-10-CM

## 2023-02-15 DIAGNOSIS — B96.89 ACUTE BRONCHITIS, BACTERIAL: Primary | ICD-10-CM

## 2023-02-15 PROCEDURE — 99213 OFFICE O/P EST LOW 20 MIN: CPT | Mod: 95,,, | Performed by: FAMILY MEDICINE

## 2023-02-15 PROCEDURE — 99213 PR OFFICE/OUTPT VISIT, EST, LEVL III, 20-29 MIN: ICD-10-PCS | Mod: 95,,, | Performed by: FAMILY MEDICINE

## 2023-02-15 RX ORDER — METHYLPREDNISOLONE 4 MG/1
TABLET ORAL
Qty: 1 EACH | Refills: 0 | Status: SHIPPED | OUTPATIENT
Start: 2023-02-15 | End: 2023-02-28

## 2023-02-15 RX ORDER — AZITHROMYCIN 250 MG/1
TABLET, FILM COATED ORAL
Qty: 6 TABLET | Refills: 0 | Status: SHIPPED | OUTPATIENT
Start: 2023-02-15 | End: 2023-02-20

## 2023-02-25 NOTE — PROGRESS NOTES
Assessment:       1. Acute bronchitis, bacterial          Plan:       Acute bronchitis, bacterial:  New problem, next visit workup  -     azithromycin (Z-ALMA DELIA) 250 MG tablet; Take 2 tablets by mouth on day 1; Take 1 tablet by mouth on days 2-5  Dispense: 6 tablet; Refill: 0  -     methylPREDNISolone (MEDROL DOSEPACK) 4 mg tablet; use as directed  Dispense: 1 each; Refill: 0         Will start patient on Z-Alma Delia, Medrol Dosepak, if the symptoms get worse, the patient notify us immediately, the patient was advised to drink plenty water, vitamin-C, vitamin-D and zinc was recommended for the patient.   Patient agreed with assessment and plan. Patient verbalized understanding.     Subjective:       Patient ID: Swapna Watkins is a 38 y.o. female.    Chief Complaint: Cough    The patient location is: [place]  The chief complaint leading to consultation is: cough    Visit type: audiovisual    Face to Face time with patient: 10 minutes  12 minutes of total time spent on the encounter, which includes face to face time and non-face to face time preparing to see the patient (eg, review of tests), Obtaining and/or reviewing separately obtained history, Documenting clinical information in the electronic or other health record, Independently interpreting results (not separately reported) and communicating results to the patient/family/caregiver, or Care coordination (not separately reported).         Each patient to whom he or she provides medical services by telemedicine is:  (1) informed of the relationship between the physician and patient and the respective role of any other health care provider with respect to management of the patient; and (2) notified that he or she may decline to receive medical services by telemedicine and may withdraw from such care at any time.    Notes:      Cough  The current episode started 1 to 4 weeks ago. The problem has been gradually worsening. The problem occurs every few minutes. The cough is  Productive of sputum. Associated symptoms include ear congestion, ear pain, headaches, myalgias, nasal congestion, postnasal drip, rhinorrhea, a sore throat and shortness of breath. Pertinent negatives include no chills, fever, heartburn, hemoptysis, rash, sweats, weight loss or wheezing. The symptoms are aggravated by cold air. Risk factors for lung disease include smoking/tobacco exposure. She has tried OTC cough suppressant and a beta-agonist inhaler for the symptoms. The treatment provided no relief. There is no history of asthma, bronchiectasis, bronchitis, COPD, emphysema, environmental allergies or pneumonia.     Past medical history, past social history was reviewed and discussed with the patient.    Review of Systems   Constitutional:  Negative for chills, fever and weight loss.   HENT:  Positive for ear pain, postnasal drip, rhinorrhea and sore throat.    Respiratory:  Positive for cough and shortness of breath. Negative for hemoptysis and wheezing.    Cardiovascular:  Negative for palpitations and leg swelling.   Gastrointestinal:  Negative for heartburn.   Musculoskeletal:  Positive for myalgias.   Skin:  Negative for rash.   Allergic/Immunologic: Negative for environmental allergies.   Neurological:  Positive for headaches.     Objective:      Physical Exam  Constitutional:       General: She is not in acute distress.     Appearance: Normal appearance.   HENT:      Head: Normocephalic and atraumatic.   Neurological:      Mental Status: She is alert.   Psychiatric:         Mood and Affect: Mood normal.         Behavior: Behavior normal.         Thought Content: Thought content normal.         Judgment: Judgment normal.

## 2023-02-28 ENCOUNTER — OFFICE VISIT (OUTPATIENT)
Dept: FAMILY MEDICINE | Facility: CLINIC | Age: 38
End: 2023-02-28
Payer: COMMERCIAL

## 2023-02-28 VITALS
HEART RATE: 71 BPM | DIASTOLIC BLOOD PRESSURE: 78 MMHG | HEIGHT: 67 IN | OXYGEN SATURATION: 98 % | SYSTOLIC BLOOD PRESSURE: 106 MMHG | WEIGHT: 183.19 LBS | BODY MASS INDEX: 28.75 KG/M2

## 2023-02-28 DIAGNOSIS — R74.8 INCREASED LIVER ENZYMES: ICD-10-CM

## 2023-02-28 DIAGNOSIS — H66.003 NON-RECURRENT ACUTE SUPPURATIVE OTITIS MEDIA OF BOTH EARS WITHOUT SPONTANEOUS RUPTURE OF TYMPANIC MEMBRANES: ICD-10-CM

## 2023-02-28 DIAGNOSIS — H92.03 OTALGIA OF BOTH EARS: Primary | ICD-10-CM

## 2023-02-28 PROCEDURE — 99213 PR OFFICE/OUTPT VISIT, EST, LEVL III, 20-29 MIN: ICD-10-PCS | Mod: S$PBB,,, | Performed by: FAMILY MEDICINE

## 2023-02-28 PROCEDURE — 99213 OFFICE O/P EST LOW 20 MIN: CPT | Mod: S$PBB,,, | Performed by: FAMILY MEDICINE

## 2023-02-28 PROCEDURE — 99999 PR PBB SHADOW E&M-EST. PATIENT-LVL III: CPT | Mod: PBBFAC,,, | Performed by: FAMILY MEDICINE

## 2023-02-28 PROCEDURE — 99999 PR PBB SHADOW E&M-EST. PATIENT-LVL III: ICD-10-PCS | Mod: PBBFAC,,, | Performed by: FAMILY MEDICINE

## 2023-02-28 RX ORDER — PREDNISONE 10 MG/1
10 TABLET ORAL DAILY
Qty: 21 TABLET | Refills: 0 | Status: SHIPPED | OUTPATIENT
Start: 2023-02-28 | End: 2023-03-21

## 2023-02-28 RX ORDER — CEFDINIR 300 MG/1
300 CAPSULE ORAL 2 TIMES DAILY
Qty: 20 CAPSULE | Refills: 0 | Status: SHIPPED | OUTPATIENT
Start: 2023-02-28 | End: 2023-03-10

## 2023-03-07 ENCOUNTER — OFFICE VISIT (OUTPATIENT)
Dept: OTOLARYNGOLOGY | Facility: CLINIC | Age: 38
End: 2023-03-07
Payer: COMMERCIAL

## 2023-03-07 VITALS — BODY MASS INDEX: 29.24 KG/M2 | WEIGHT: 186.31 LBS | HEIGHT: 67 IN

## 2023-03-07 DIAGNOSIS — H69.93 ETD (EUSTACHIAN TUBE DYSFUNCTION), BILATERAL: Primary | ICD-10-CM

## 2023-03-07 DIAGNOSIS — H92.03 OTALGIA OF BOTH EARS: ICD-10-CM

## 2023-03-07 PROCEDURE — 99204 PR OFFICE/OUTPT VISIT, NEW, LEVL IV, 45-59 MIN: ICD-10-PCS | Mod: S$PBB,,, | Performed by: STUDENT IN AN ORGANIZED HEALTH CARE EDUCATION/TRAINING PROGRAM

## 2023-03-07 PROCEDURE — 99999 PR PBB SHADOW E&M-EST. PATIENT-LVL III: ICD-10-PCS | Mod: PBBFAC,,, | Performed by: STUDENT IN AN ORGANIZED HEALTH CARE EDUCATION/TRAINING PROGRAM

## 2023-03-07 PROCEDURE — 99999 PR PBB SHADOW E&M-EST. PATIENT-LVL III: CPT | Mod: PBBFAC,,, | Performed by: STUDENT IN AN ORGANIZED HEALTH CARE EDUCATION/TRAINING PROGRAM

## 2023-03-07 PROCEDURE — 99204 OFFICE O/P NEW MOD 45 MIN: CPT | Mod: S$PBB,,, | Performed by: STUDENT IN AN ORGANIZED HEALTH CARE EDUCATION/TRAINING PROGRAM

## 2023-03-07 RX ORDER — AZELASTINE 1 MG/ML
2 SPRAY, METERED NASAL 2 TIMES DAILY PRN
Qty: 30 ML | Refills: 6 | Status: SHIPPED | OUTPATIENT
Start: 2023-03-07 | End: 2024-03-06

## 2023-03-07 RX ORDER — FLUTICASONE PROPIONATE 50 MCG
1 SPRAY, SUSPENSION (ML) NASAL 2 TIMES DAILY
Qty: 16 G | Refills: 11 | Status: SHIPPED | OUTPATIENT
Start: 2023-03-07

## 2023-03-07 RX ORDER — MONTELUKAST SODIUM 10 MG/1
10 TABLET ORAL NIGHTLY
Qty: 60 TABLET | Refills: 0 | Status: SHIPPED | OUTPATIENT
Start: 2023-03-07 | End: 2023-05-06

## 2023-03-07 NOTE — PATIENT INSTRUCTIONS
Stop abx, can do 3 more days of steroids. Could do sudafed if still feeling lots of pressure after.    Stop afrin.   Start saline sprays twice a day, then flonase and astelin twice a day (astepro, fluticasone or any nasal steroid)  Take daily zyrtec and singulair.   Pop ears three times daily.

## 2023-03-07 NOTE — PROGRESS NOTES
Otolaryngology Clinic Note    Subjective:       Patient ID: Swapna Watkins is a 38 y.o. female.    Chief Complaint: Ear Fullness      History of Present Illness: Swapna Watkins is a 38 y.o. female presenting with sinus infection 1 month ago, lots of pressure/pain. Got abx and steroids, sinus cleared but ears are still clogged, won't pop, ringing. Getting headaches, makes mouth hurt. Got more abx and steroids last week. Oral steroids both times. Cannot pop ears. Has not been able to.   Was zpack and medrol pack first then cefdinir, on 21 days prednisone right now. Causing Gi upset and some thick discharge. Possibly has a yeast infection right now.   Tried netipot, fluticasone. OTC nasal sprays. Tried antihistamine. Not doing now. She thinks she is using afrin for some PND that is lingering.       Past Surgical History:   Procedure Laterality Date    KNEE ARTHROSCOPY  12/06/2011    Lt Knee     Past Medical History:   Diagnosis Date    Acute internal derangement of left knee 11/21/2017    Bone cyst 11/13/2017    Internal derangement of knee joint, left 11/6/2017    Left anterior cruciate ligament tear 11/6/2017    S/P ACL reconstruction 12/22/2011     Social Determinants of Health     Tobacco Use: Medium Risk    Smoking Tobacco Use: Former    Smokeless Tobacco Use: Never    Passive Exposure: Not on file   Alcohol Use: Not At Risk    Frequency of Alcohol Consumption: Monthly or less    Average Number of Drinks: 1 or 2    Frequency of Binge Drinking: Never   Financial Resource Strain: Low Risk     Difficulty of Paying Living Expenses: Not very hard   Food Insecurity: Unknown    Worried About Running Out of Food in the Last Year: Patient refused    Ran Out of Food in the Last Year: Patient refused   Transportation Needs: Unknown    Lack of Transportation (Medical): Patient refused    Lack of Transportation (Non-Medical): Patient refused   Physical Activity: Inactive    Days of Exercise per Week: 0 days    Minutes of  Exercise per Session: 0 min   Stress: Stress Concern Present    Feeling of Stress : Very much   Social Connections: Unknown    Frequency of Communication with Friends and Family: Twice a week    Frequency of Social Gatherings with Friends and Family: Patient refused    Attends Rastafarian Services: Not on file    Active Member of Clubs or Organizations: No    Attends Club or Organization Meetings: Never    Marital Status:    Housing Stability: Unknown    Unable to Pay for Housing in the Last Year: Patient refused    Number of Places Lived in the Last Year: Not on file    Unstable Housing in the Last Year: Patient refused   Depression: Low Risk     Last PHQ Score: 0     Review of patient's allergies indicates:   Allergen Reactions    No known drug allergies      Current Outpatient Medications   Medication Instructions    ascorbic acid (vitamin C) (VITAMIN C) 1,000 mg, Oral, Daily    cefdinir (OMNICEF) 300 mg, Oral, 2 times daily    predniSONE (DELTASONE) 10 mg, Oral, Daily         ENT ROS negative except as stated above.     Patient answers are not available for this visit.            Objective:      There were no vitals filed for this visit.    General: NAD, well appearing  Eyes: Normal conjunctiva and lids  Face: symmetric, nerve intact  Nose: The nose is without any evidence of any deformity. The nasal mucosa is moist. The septum is midline. There is no evidence of septal hematoma. The turbinates are without abnormality.   Ears: The ears are with normal-appearing pinna. Examination of the canals is normal appearing bilaterally. There is no drainage or erythema noted. The tympanic membranes are normal appearing with pearly color, normal-appearing landmarks and normal light reflex. Hearing is grossly intact. Valsalva + BL  Mouth: No obvious abnormalities to the lips. The teeth are unremarkable. The gingivae are without any obvious evidence of infection or lesion. The oral mucosa is moist and pink. There are no  obvious masses to the hard or soft palate.   Oropharynx: The uvula is midline.  The tongue is midline. The posterior pharynx is without erythema or exudate. The tonsils are normal appearing.  Salivary glands: The salivary glands are symmetric and not enlarged, no masses  Neck: No lymphadenopathy, trachea midline, thryoid not enlarged.  Psych: Normal mood and affect.   Neuro: Grossly intact  Speech: fluent     Assessment and Plan:       1. ETD (Eustachian tube dysfunction), bilateral    2. Otalgia of both ears        Reviewed that she does not have fluid in ears, can pop them. Issues is pressure, possible mastoid sinus inflammation.     Stop abx, can stop or continue steroids. She will complete 3 more days.   Stop afrin.   Start saline sprays twice a day, then flonase and astelin   Take daily zyrtec and singulair.   Trial this for 2-6 weeks depending on time to resolution. Contact if not better after 4-6 weeks.     RTC: PRN    Plan of care was discussed in detail with the patient, who agreed with the plan as above. All questions were answered in detail.     Joycelyn Philip MD  Otolaryngology

## 2023-03-08 NOTE — PROGRESS NOTES
Assessment:       1. Otalgia of both ears    2. Increased liver enzymes    3. Non-recurrent acute suppurative otitis media of both ears without spontaneous rupture of tympanic membranes          Plan:       Otalgia of both ears:  Worsening  -     Ambulatory referral/consult to ENT; Future; Expected date: 03/07/2023  -     predniSONE (DELTASONE) 10 MG tablet; Take 1 tablet (10 mg total) by mouth once daily. for 21 days  Dispense: 21 tablet; Refill: 0  -     cefdinir (OMNICEF) 300 MG capsule; Take 1 capsule (300 mg total) by mouth 2 (two) times daily. for 10 days  Dispense: 20 capsule; Refill: 0    Increased liver enzymes:  New problem workup needed  -     Comprehensive Metabolic Panel; Future; Expected date: 02/28/2023    Non-recurrent acute suppurative otitis media of both ears without spontaneous rupture of tympanic membranes:  Worsening  -     predniSONE (DELTASONE) 10 MG tablet; Take 1 tablet (10 mg total) by mouth once daily. for 21 days  Dispense: 21 tablet; Refill: 0  -     cefdinir (OMNICEF) 300 MG capsule; Take 1 capsule (300 mg total) by mouth 2 (two) times daily. for 10 days  Dispense: 20 capsule; Refill: 0         Will start patient severe, prednisone, if the symptoms get worse, the patient notify us immediately, will refer the patient to see the GI specialist as soon as possible.  Will repeat liver enzymes.   Patient agreed with assessment and plan. Patient verbalized understanding.     Subjective:       Patient ID: Swapna Watkins is a 38 y.o. female.    Chief Complaint: Otalgia (Ear pain since 02/08)    Otalgia   There is pain in both ears. This is a recurrent problem. The current episode started 1 to 4 weeks ago. The problem occurs constantly. The problem has been gradually worsening. There has been no fever. The fever has been present for Less than 1 day. The pain is at a severity of 8/10. The pain is severe. Associated symptoms include headaches. Pertinent negatives include no abdominal pain,  coughing, diarrhea, drainage, ear discharge, hearing loss, neck pain, rash, rhinorrhea, sore throat or vomiting. She has tried NSAIDs and ear drops for the symptoms. The treatment provided no relief. There is no history of a chronic ear infection, hearing loss or a tympanostomy tube.     Past medical history, past social history was reviewed and discussed with the patient.    Review of Systems   Constitutional:  Negative for activity change, appetite change and chills.   HENT:  Positive for ear pain. Negative for congestion, ear discharge, hearing loss, rhinorrhea and sore throat.    Eyes:  Negative for discharge and itching.   Respiratory:  Negative for cough, choking and chest tightness.    Cardiovascular:  Negative for chest pain, palpitations and leg swelling.   Gastrointestinal:  Negative for abdominal distention, abdominal pain, constipation, diarrhea and vomiting.   Endocrine: Negative for cold intolerance and heat intolerance.   Genitourinary:  Negative for dysuria and flank pain.   Musculoskeletal:  Negative for arthralgias, back pain and neck pain.   Skin:  Negative for pallor and rash.   Allergic/Immunologic: Negative for environmental allergies and food allergies.   Neurological:  Positive for headaches. Negative for dizziness and facial asymmetry.   Hematological:  Negative for adenopathy. Does not bruise/bleed easily.   Psychiatric/Behavioral:  Negative for agitation, confusion, decreased concentration and sleep disturbance.      Objective:      Physical Exam  Vitals and nursing note reviewed.   Constitutional:       General: She is not in acute distress.     Appearance: Normal appearance. She is well-developed. She is not diaphoretic.   HENT:      Head: Normocephalic and atraumatic.      Right Ear: External ear normal. A middle ear effusion is present. Tympanic membrane is erythematous.      Left Ear: External ear normal.  No middle ear effusion. Tympanic membrane is erythematous and retracted.       Nose: Nose normal.   Eyes:      General: No scleral icterus.        Right eye: No discharge.         Left eye: No discharge.      Conjunctiva/sclera: Conjunctivae normal.   Cardiovascular:      Rate and Rhythm: Normal rate and regular rhythm.      Heart sounds: Normal heart sounds.   Pulmonary:      Effort: Pulmonary effort is normal. No respiratory distress.      Breath sounds: Normal breath sounds. No wheezing.   Musculoskeletal:         General: No tenderness or deformity.      Cervical back: Neck supple.   Skin:     Coloration: Skin is not pale.      Findings: No erythema.   Neurological:      Mental Status: She is alert.      Cranial Nerves: No cranial nerve deficit.   Psychiatric:         Behavior: Behavior normal.         Thought Content: Thought content normal.         Judgment: Judgment normal.

## 2023-09-29 ENCOUNTER — OFFICE VISIT (OUTPATIENT)
Dept: OTOLARYNGOLOGY | Facility: CLINIC | Age: 38
End: 2023-09-29

## 2023-09-29 ENCOUNTER — CLINICAL SUPPORT (OUTPATIENT)
Dept: AUDIOLOGY | Facility: CLINIC | Age: 38
End: 2023-09-29

## 2023-09-29 VITALS — BODY MASS INDEX: 27.97 KG/M2 | WEIGHT: 178.56 LBS

## 2023-09-29 DIAGNOSIS — H92.03 REFERRED OTALGIA, BILATERAL: ICD-10-CM

## 2023-09-29 DIAGNOSIS — R42 DYSEQUILIBRIUM: ICD-10-CM

## 2023-09-29 DIAGNOSIS — G44.229 CHRONIC TENSION-TYPE HEADACHE, NOT INTRACTABLE: ICD-10-CM

## 2023-09-29 DIAGNOSIS — F45.8 BRUXISM: Primary | ICD-10-CM

## 2023-09-29 DIAGNOSIS — H92.03 REFERRED OTALGIA, BILATERAL: Primary | ICD-10-CM

## 2023-09-29 PROCEDURE — 99999 PR PBB SHADOW E&M-EST. PATIENT-LVL III: CPT | Mod: PBBFAC,,, | Performed by: NURSE PRACTITIONER

## 2023-09-29 PROCEDURE — 92567 TYMPANOMETRY: CPT | Mod: PBBFAC,PO | Performed by: AUDIOLOGIST

## 2023-09-29 PROCEDURE — 99214 PR OFFICE/OUTPT VISIT, EST, LEVL IV, 30-39 MIN: ICD-10-PCS | Mod: S$PBB,,, | Performed by: NURSE PRACTITIONER

## 2023-09-29 PROCEDURE — 99999 PR PBB SHADOW E&M-EST. PATIENT-LVL III: ICD-10-PCS | Mod: PBBFAC,,, | Performed by: NURSE PRACTITIONER

## 2023-09-29 PROCEDURE — 99213 OFFICE O/P EST LOW 20 MIN: CPT | Mod: PBBFAC,PO | Performed by: NURSE PRACTITIONER

## 2023-09-29 PROCEDURE — 99214 OFFICE O/P EST MOD 30 MIN: CPT | Mod: S$PBB,,, | Performed by: NURSE PRACTITIONER

## 2023-09-29 RX ORDER — SEMAGLUTIDE 1.34 MG/ML
0.75 INJECTION, SOLUTION SUBCUTANEOUS
COMMUNITY

## 2023-09-29 NOTE — PROGRESS NOTES
Swapna Watkins was seen on 09/29/2023 for tympanometry testing as requested by ENT to evaluate tympanic mobility.       Results revealed:   Right Ear:  Type A tymp   Left Ear:  Type A tymp     Impressions: Bilateral otalgia    Recommend f/u with ENT for further evaluation of symptoms and to discuss treatment options as appropriate.

## 2023-09-29 NOTE — PATIENT INSTRUCTIONS
"When the eustachian tube is functioning normally, every single time you swallow, yawn, blow your nose, etc., your ears will pop.  This popping is when the eustachian tube opens transiently allowing air to pass from the middle ear to the back of the nose which is open to the environment.  Assuming there is no ear wax or fluid behind the eardrum, ear fullness is usually due to the inability to pop the ears easily.  A pressure difference between the air pressure behind the eardrum and the air pressure outside of the eardrum develops.  When there is a pressure difference, the eardrum can bulge inward or outward creating a sensation of fullness for the person.     EUSTACHIAN TUBE INSTRUCTIONS:  Nasal valsalva:  Pinch nose and with closed mouth try to "pop" air into ears through the back of the nose. Attempt this several times a day. The more popping you have, the quicker it will resolve.     Flonase / fluticasone (steroid spray) is best for stuffy, pressure, fullness. Available over-the-counter without a prescription.   Astepro / azelastine (antihistamine spray) is best for itchy, drippy, sneezy. Available over-the-counter without a prescription.     Use as directed, spraying 1-2 times in each nostril each day. It may take 2-3 days to 2-3 weeks to begin seeing improvement. This medication needs to be taken consistently to see results. Overall, this is a well-tolerated medication with low side effects. The benefit of nasal steroids as opposed to oral steroids is that the nasal steroid spray works primarily in the nose. Common side effects can include: headache, nasal dryness, minor nose bleed.  Rare side effects may include:  septal perforation, elevation in eye pressure, dry eyes, change in smell, allergic reaction.  Notify your provider if you have any concerns or experience these symptoms.     Nasal spray instructions:  Blow nose first gently to clean. Keep chin level with the floor (do not tilt head forward or back). " Insert nasal spray taking caution to direct it AWAY from the middle wall inside the nose (septum) to avoid irritating nasal septum which could cause nosebleed.  Do not tilt spray up but rather flat and out along the roof of your mouth to spray. Angle the tip of the spray out slightly toward the direction of the ears; then spray. Do not take quick vigorous sniff but rather slow gentle inhalation while waiting for medication to absorb into nasal passages. Then administer second spray in same way.

## 2023-09-29 NOTE — PROGRESS NOTES
"Subjective     Patient ID: Swapna Watkins is a 38 y.o. female.    Chief Complaint: Ear Fullness    Ear Fullness   Associated symptoms include headaches.   Patient was last seen 6 months ago by Dr. Philip for ETD/otalgia; treated with Flonase, Astelin, and Singulair. Returns today for continue aural fullness/otalgia. Patient reports nasal sprays and allergy meds have not helped. Patient states her ears pop, but still continue to feel full and feels like they are getting worse. Patient reports (+)bruxism and headaches. Patient also endorses dizziness and nausea. Dizziness is described as a head rush, foggy-headedness. No vertigo. Quit smoking 2 years ago.     Review of Systems   Constitutional: Negative.    HENT:  Positive for dental problem and ear pain.    Eyes: Negative.    Respiratory: Negative.     Cardiovascular: Negative.    Gastrointestinal:  Positive for nausea.   Musculoskeletal: Negative.    Integumentary:  Negative.   Neurological:  Positive for dizziness and headaches.   Hematological: Negative.    Psychiatric/Behavioral: Negative.            Objective     Physical Exam  Vitals and nursing note reviewed.   Constitutional:       General: She is not in acute distress.     Appearance: She is well-developed. She is not ill-appearing or diaphoretic.   HENT:      Head: Normocephalic and atraumatic.      Right Ear: Hearing, tympanic membrane, ear canal and external ear normal. No middle ear effusion. Tympanic membrane is not erythematous. Tympanic membrane has normal mobility.      Left Ear: Hearing, tympanic membrane, ear canal and external ear normal.  No middle ear effusion. Tympanic membrane is not erythematous. Tympanic membrane has normal mobility.      Ears:      Comments: Type "A" tympanograms AU consistent with well-pneumatized mesotympanums and absence of middle ear effusions     Nose: Nose normal. No mucosal edema, congestion or rhinorrhea.      Right Sinus: No maxillary sinus tenderness or frontal " sinus tenderness.      Left Sinus: No maxillary sinus tenderness or frontal sinus tenderness.      Mouth/Throat:      Mouth: Mucous membranes are not pale, not dry and not cyanotic. No oral lesions.      Tongue: No lesions.      Palate: No lesions.      Pharynx: Uvula midline. No oropharyngeal exudate or posterior oropharyngeal erythema.   Eyes:      General: Lids are normal. No scleral icterus.        Right eye: No discharge.         Left eye: No discharge.   Neck:      Thyroid: No thyroid mass or thyromegaly.      Trachea: Trachea normal. No tracheal deviation.   Cardiovascular:      Rate and Rhythm: Normal rate.   Pulmonary:      Effort: Pulmonary effort is normal. No respiratory distress.      Breath sounds: Normal air entry.   Musculoskeletal:         General: Normal range of motion.      Cervical back: Normal range of motion and neck supple.   Lymphadenopathy:      Head:      Right side of head: No submental, submandibular, tonsillar, preauricular or posterior auricular adenopathy.      Left side of head: No submental, submandibular, tonsillar, preauricular or posterior auricular adenopathy.      Cervical: No cervical adenopathy.      Right cervical: No superficial or posterior cervical adenopathy.     Left cervical: No superficial or posterior cervical adenopathy.   Skin:     General: Skin is warm and dry.      Coloration: Skin is not pale.      Findings: No lesion or rash.   Neurological:      Mental Status: She is alert and oriented to person, place, and time.      Motor: Motor function is intact.      Coordination: Coordination is intact.      Gait: Gait normal.   Psychiatric:         Attention and Perception: Attention normal.         Mood and Affect: Mood normal.         Speech: Speech normal.         Behavior: Behavior normal. Behavior is cooperative.          Assessment and Plan     1. Bruxism    2. Chronic tension-type headache, not intractable    3. Referred otalgia, bilateral    4.  Dysequilibrium      Discussed ETD at length. Discussed conservative measures of nasal sprays and allergy meds.  Discussed more invasive measures such as Eustachian tube balloon dilation.  Discussed bruxism/TMJ at length.  Discussed conservative measures of oral appliance, NSAIDs, avoid bruxism, heat/ice.   Offered f/u appt with Dr. Philip if she wishes to be evaluated for possible ET balloon dilation, but patient would like to try other measures discussed today first and agrees to call back if symptoms are not responding.   Patient encouraged to return to clinic if symptoms worsen/persist and as needed for further ENT symptoms or concerns.          No follow-ups on file.

## 2024-04-24 RX ORDER — AZELASTINE 1 MG/ML
SPRAY, METERED NASAL
Qty: 30 ML | Refills: 0 | Status: SHIPPED | OUTPATIENT
Start: 2024-04-24

## 2024-04-24 RX ORDER — FLUTICASONE PROPIONATE 50 MCG
1 SPRAY, SUSPENSION (ML) NASAL 2 TIMES DAILY
Qty: 16 G | Refills: 0 | Status: SHIPPED | OUTPATIENT
Start: 2024-04-24

## 2024-07-09 ENCOUNTER — PATIENT MESSAGE (OUTPATIENT)
Dept: ADMINISTRATIVE | Facility: HOSPITAL | Age: 39
End: 2024-07-09

## 2025-08-19 ENCOUNTER — PATIENT MESSAGE (OUTPATIENT)
Dept: ADMINISTRATIVE | Facility: HOSPITAL | Age: 40
End: 2025-08-19

## (undated) DEVICE — Device

## (undated) DEVICE — TOURNIQUET SB QC SP 34X4IN

## (undated) DEVICE — PAD CAST SPECIALIST STRL 6

## (undated) DEVICE — PAD COLD THERAPY KNEE WRAP ON

## (undated) DEVICE — SPONGE SURGIFOAM 100 8.5X12X10

## (undated) DEVICE — CONTAINER SPECIMEN STRL 4OZ

## (undated) DEVICE — SPONGE GELFOAM 12-7MM

## (undated) DEVICE — SHAVER SYS 5.5 ULRAFRR

## (undated) DEVICE — GLOVE BIOGEL SKINSENSE PI 7.0

## (undated) DEVICE — SYS PRINEO SKIN CLOSURE

## (undated) DEVICE — SUT PDS VIL/BLU DUAL ORTHO

## (undated) DEVICE — NDL HYPO SAFETY 22 X 1 1/2

## (undated) DEVICE — SEE MEDLINE ITEM 152530

## (undated) DEVICE — DRAPE STERI INSTRUMENT 1018

## (undated) DEVICE — PAD ELECTRODE STERILE LG 2X3.5

## (undated) DEVICE — SUT ETHILON 4-0 BLK MONO

## (undated) DEVICE — UNDERGLOVES BIOGEL PI SIZE 7.5

## (undated) DEVICE — SEE MEDLINE ITEM 157150

## (undated) DEVICE — ACL DISPOSABLE KIT

## (undated) DEVICE — BRACE KNEE T SCOPE PREMIER

## (undated) DEVICE — UNDERGLOVES BIOGEL PI SIZE 8.5

## (undated) DEVICE — PAD ABD 8X10 STERILE

## (undated) DEVICE — SOL 9P NACL IRR PIC IL

## (undated) DEVICE — APPLICATOR CHLORAPREP ORN 26ML

## (undated) DEVICE — GAUZE SPONGE 4X4 12PLY

## (undated) DEVICE — GLOVE BIOGEL SKINSENSE PI 8.5

## (undated) DEVICE — UNDERGLOVES BIOGEL PI SZ 7 LF

## (undated) DEVICE — CHLORAPREP W TINT 26ML APPL

## (undated) DEVICE — ELECTRODE 90 DEGREE ANGLE

## (undated) DEVICE — POSITIONER IV ARMBOARD FOAM

## (undated) DEVICE — SYS CLSR DERMABOND PRINEO 22CM

## (undated) DEVICE — SUT VICRYL+ 1 CT1 18IN

## (undated) DEVICE — DRAPE EMERALD 87X114.75X113

## (undated) DEVICE — KIT FIBRIN SEALANT EVICEL 5 ML

## (undated) DEVICE — TUBE SET INFLOW/OUTFLOW

## (undated) DEVICE — SOL IRR NACL .9% 3000ML

## (undated) DEVICE — SYR 30CC LUER LOCK

## (undated) DEVICE — GAUZE SPONGE 4'X4 12 PLY

## (undated) DEVICE — DRAPE STERI U-SHAPED 47X51IN

## (undated) DEVICE — DRESSING TRANS 4X4 TEGADERM

## (undated) DEVICE — SUT VICRYL 3-0 27 SH

## (undated) DEVICE — SUT VICRYL PLUS 3-0 SH 18IN

## (undated) DEVICE — PAD ELECTRODE STER 1.5X3

## (undated) DEVICE — SUT MCRYL PLUS 4-0 PS2 27IN

## (undated) DEVICE — SPONGE LAP 18X18 PREWASHED

## (undated) DEVICE — SEE MEDLINE ITEM 153151

## (undated) DEVICE — BLADE 4.2MM PREBENT ULTRACUT

## (undated) DEVICE — SHAVER ULTRAFFR 4.2MM

## (undated) DEVICE — DRESSING XEROFORM FOIL PK 1X8

## (undated) DEVICE — BLADE SURG STAINLESS STEEL #15

## (undated) DEVICE — SEE MEDLINE ITEM 157126

## (undated) DEVICE — NDL SAFETY 22G X 1.5 ECLIPSE

## (undated) DEVICE — PENCIL ELECTROSURG HOLST W/BLD

## (undated) DEVICE — SEE MEDLINE ITEM 157169

## (undated) DEVICE — SUT VICRYL PLUS ANTIBACT

## (undated) DEVICE — GOWN B1 X-LG X-LONG

## (undated) DEVICE — NDL 18GA X1 1/2 REG BEVEL

## (undated) DEVICE — CANNULA HEX FLEX 8 X 85MM

## (undated) DEVICE — ELECTRODE REM PLYHSV RETURN 9

## (undated) DEVICE — SET DECANTER MEDICHOICE

## (undated) DEVICE — REAMER 4.5MM GRAFTMAX FLEX CH

## (undated) DEVICE — NEEDLE HYPODERMIC ALUM HUB 22G